# Patient Record
Sex: FEMALE | NOT HISPANIC OR LATINO | Employment: FULL TIME | URBAN - METROPOLITAN AREA
[De-identification: names, ages, dates, MRNs, and addresses within clinical notes are randomized per-mention and may not be internally consistent; named-entity substitution may affect disease eponyms.]

---

## 2017-10-04 ENCOUNTER — HOSPITAL ENCOUNTER (OUTPATIENT)
Dept: PREADMISSION TESTING | Facility: OTHER | Age: 37
Discharge: HOME | End: 2017-10-04
Attending: OBSTETRICS & GYNECOLOGY
Payer: COMMERCIAL

## 2017-10-04 VITALS
HEIGHT: 72 IN | OXYGEN SATURATION: 99 % | HEART RATE: 64 BPM | WEIGHT: 293 LBS | BODY MASS INDEX: 39.68 KG/M2 | DIASTOLIC BLOOD PRESSURE: 84 MMHG | SYSTOLIC BLOOD PRESSURE: 127 MMHG | TEMPERATURE: 98 F

## 2017-10-04 PROBLEM — D25.0 SUBMUCOUS LEIOMYOMA OF UTERUS: Status: ACTIVE | Noted: 2017-10-04

## 2017-10-04 LAB
ANION GAP SERPL CALC-SCNC: 7 MMOL/L
BUN SERPL-MCNC: 16 MG/DL
CALCIUM SERPL-MCNC: 9.8 MG/DL
CHLORIDE SERPL-SCNC: 106 MMOL/L
CO2 SERPL-SCNC: 26 MMOL/L
CREAT SERPL-MCNC: 0.7 MG/DL
EST. GFR  (AFRICAN AMERICAN): >60 ML/MIN/1.73 M^2
EST. GFR  (NON AFRICAN AMERICAN): >60 ML/MIN/1.73 M^2
GLUCOSE SERPL-MCNC: 80 MG/DL
POTASSIUM SERPL-SCNC: 3.8 MMOL/L
SODIUM SERPL-SCNC: 139 MMOL/L

## 2017-10-04 PROCEDURE — 86644 CMV ANTIBODY: CPT

## 2017-10-04 PROCEDURE — 93010 ELECTROCARDIOGRAM REPORT: CPT | Mod: ,,, | Performed by: INTERNAL MEDICINE

## 2017-10-04 PROCEDURE — 36415 COLL VENOUS BLD VENIPUNCTURE: CPT

## 2017-10-04 PROCEDURE — 86645 CMV ANTIBODY IGM: CPT

## 2017-10-04 PROCEDURE — 93005 ELECTROCARDIOGRAM TRACING: CPT

## 2017-10-04 PROCEDURE — 80048 BASIC METABOLIC PNL TOTAL CA: CPT

## 2017-10-04 RX ORDER — LIDOCAINE HYDROCHLORIDE 10 MG/ML
1 INJECTION, SOLUTION EPIDURAL; INFILTRATION; INTRACAUDAL; PERINEURAL ONCE
Status: CANCELLED | OUTPATIENT
Start: 2017-10-04 | End: 2017-10-04

## 2017-10-04 RX ORDER — SODIUM CHLORIDE, SODIUM LACTATE, POTASSIUM CHLORIDE, CALCIUM CHLORIDE 600; 310; 30; 20 MG/100ML; MG/100ML; MG/100ML; MG/100ML
INJECTION, SOLUTION INTRAVENOUS CONTINUOUS
Status: CANCELLED | OUTPATIENT
Start: 2017-10-04

## 2017-10-04 RX ORDER — MIDAZOLAM HYDROCHLORIDE 1 MG/ML
5 INJECTION INTRAMUSCULAR; INTRAVENOUS
Status: ACTIVE | OUTPATIENT
Start: 2017-10-04 | End: 2017-10-05

## 2017-10-04 NOTE — DISCHARGE INSTRUCTIONS
PRE-ADMIT TESTING -  [phone]    [address]  [place] [facility]          Your surgery has been scheduled at [facility]. We are pleased to have the opportunity to serve you. For Further Information please call [phone].    On the day of surgery please report to the Information Desk on the 1st floor.    · CONTACT YOUR PHYSICIAN'S OFFICE THE DAY PRIOR TO YOUR SURGERY TO OBTAIN YOUR ARRIVAL TIME.     · The evening before surgery do not eat anything after 9 p.m. ( this includes hard candy, chewing gum and mints).  You may only have GATORADE, POWERADE AND WATER  from 9 p.m. until you leave your home.   DO NOT DRINK ANY LIQUIDS ON THE WAY TO THE HOSPITAL.      SPECIAL MEDICATION INSTRUCTIONS: TAKE medications checked off by the Anesthesiologist on your Medication List.    Angiogram Patients: Take medications as instructed by your physician, including aspirin.     Surgery Patients:    If you take ASPIRIN - Your PHYSICIAN/SURGEON will need to inform you IF/OR when you need to stop taking aspirin prior to your surgery.     Do Not take any medications containing IBUPROFEN.  Do Not Wear any make-up or dark nail polish   (especially eye make-up) to surgery. If you come to surgery with makeup on you will be required to remove the makeup or nail polish.    Do not shave your surgical area at least 5 days prior to your surgery. The surgical prep will be performed at the hospital according to Infection Control regulations.    Leave all valuables at home.   Do Not wear any jewelry or watches, including any metal in body piercings.  Contact Lens must be removed before surgery. Either do not wear the contact lens or bring a case and solution for storage.  Please bring a container for eyeglasses or dentures as required.  Bring any paperwork your physician has provided, such as consent forms,  history and physicals, doctor's orders, etc.   Bring comfortable clothes that are loose fitting to wear upon  discharge. Take into consideration the type of surgery being performed.  Maintain your diet as advised per your physician the day prior to surgery.      Adequate rest the night before surgery is advised.   Park in the Parking lot behind the hospital or in the [facility] across the street from the parking lot. Parking is complimentary.  If you will be discharged the same day as your procedure, please arrange for a responsible adult to drive you home or to accompany you if traveling by taxi.   YOU WILL NOT BE PERMITTED TO DRIVE OR TO LEAVE THE HOSPITAL ALONE AFTER SURGERY.   It is strongly recommended that you arrange for someone to remain with you for the first 24 hrs following your surgery.       Thank you for your cooperation.  The Staff of [facility].        Bathing Instructions                                                                 Please shower the evening before and morning of your procedure with    ANTIBACTERIAL SOAP. ( DIAL, etc )  Concentrate on the surgical area   for at least 3 minutes and rinse completely. Dry off as usual.   Do not use any deodorant, powder, body lotions, perfume, after shave or    cologne.

## 2017-10-04 NOTE — PRE-PROCEDURE INSTRUCTIONS
Sx cancelled and rescheduled at another facility. Blood bank called, T&S not run yet, requested to cancel the test and Lottie notified.

## 2017-10-06 LAB
CMV IGG SERPL QL IA: REACTIVE
CMV IGM TITR SERPL: <8 U/ML

## 2018-03-05 DIAGNOSIS — D25.0 INTRAMURAL AND SUBMUCOUS LEIOMYOMA OF UTERUS: ICD-10-CM

## 2018-03-05 DIAGNOSIS — D25.1 INTRAMURAL AND SUBMUCOUS LEIOMYOMA OF UTERUS: ICD-10-CM

## 2018-03-12 ENCOUNTER — HOSPITAL ENCOUNTER (OUTPATIENT)
Dept: RADIOLOGY | Facility: OTHER | Age: 38
Discharge: HOME | End: 2018-03-12
Attending: OBSTETRICS & GYNECOLOGY
Payer: COMMERCIAL

## 2018-03-12 DIAGNOSIS — D25.0 INTRAMURAL AND SUBMUCOUS LEIOMYOMA OF UTERUS: ICD-10-CM

## 2018-03-12 DIAGNOSIS — D25.1 INTRAMURAL AND SUBMUCOUS LEIOMYOMA OF UTERUS: ICD-10-CM

## 2018-03-12 PROCEDURE — A9585 GADOBUTROL INJECTION: HCPCS | Performed by: OBSTETRICS & GYNECOLOGY

## 2018-03-12 PROCEDURE — 25500020 PHARM REV CODE 255: Performed by: OBSTETRICS & GYNECOLOGY

## 2018-03-12 PROCEDURE — 72197 MRI PELVIS W/O & W/DYE: CPT | Mod: TC

## 2018-03-12 PROCEDURE — 72197 MRI PELVIS W/O & W/DYE: CPT | Mod: 26,,, | Performed by: RADIOLOGY

## 2018-03-12 RX ORDER — GADOBUTROL 604.72 MG/ML
10 INJECTION INTRAVENOUS
Status: COMPLETED | OUTPATIENT
Start: 2018-03-12 | End: 2018-03-12

## 2018-03-12 RX ADMIN — GADOBUTROL 10 ML: 604.72 INJECTION INTRAVENOUS at 12:03

## 2018-03-20 ENCOUNTER — TELEPHONE (OUTPATIENT)
Dept: OBSTETRICS AND GYNECOLOGY | Facility: CLINIC | Age: 38
End: 2018-03-20

## 2018-03-20 NOTE — TELEPHONE ENCOUNTER
----- Message from Marc Howell MA sent at 3/19/2018  5:02 PM CDT -----  Yes, that is too late.  Try to get her in for an 8:15 am appointment

## 2018-03-20 NOTE — TELEPHONE ENCOUNTER
Attempted to contact the pt to schedule an fibroids consult with Dr. Minaya. No answer, left VM message for the pt to call the clinic back to schedule.

## 2018-06-15 ENCOUNTER — OFFICE VISIT (OUTPATIENT)
Dept: OBSTETRICS AND GYNECOLOGY | Facility: CLINIC | Age: 38
End: 2018-06-15
Attending: OBSTETRICS & GYNECOLOGY
Payer: COMMERCIAL

## 2018-06-15 VITALS
DIASTOLIC BLOOD PRESSURE: 84 MMHG | HEIGHT: 72 IN | WEIGHT: 293 LBS | SYSTOLIC BLOOD PRESSURE: 122 MMHG | BODY MASS INDEX: 39.68 KG/M2

## 2018-06-15 DIAGNOSIS — D25.0 SUBMUCOUS UTERINE FIBROID: ICD-10-CM

## 2018-06-15 DIAGNOSIS — D25.0 FIBROIDS, SUBMUCOSAL: Primary | ICD-10-CM

## 2018-06-15 DIAGNOSIS — E66.01 MORBID OBESITY WITH BMI OF 45.0-49.9, ADULT: ICD-10-CM

## 2018-06-15 PROCEDURE — 99999 PR PBB SHADOW E&M-EST. PATIENT-LVL III: CPT | Mod: PBBFAC,,, | Performed by: OBSTETRICS & GYNECOLOGY

## 2018-06-15 PROCEDURE — 99244 OFF/OP CNSLTJ NEW/EST MOD 40: CPT | Mod: S$GLB,,, | Performed by: OBSTETRICS & GYNECOLOGY

## 2018-06-15 RX ORDER — SODIUM CHLORIDE 9 MG/ML
INJECTION, SOLUTION INTRAVENOUS CONTINUOUS
Status: CANCELLED | OUTPATIENT
Start: 2018-06-15

## 2018-06-15 NOTE — PATIENT INSTRUCTIONS
Having Robotic-Assisted Myomectomy  Robotic-assisted myomectomy is a type of surgery. Its done to remove growths in a womens womb (uterus) called fibroid tumors. The tumors are not cancer. The surgery is done with special tools and a robotic controller.  What to tell your healthcare provider  Tell your healthcare provider about all the medicines you take. This includes over-the-counter medicines such as ibuprofen. It also includes vitamins, herbs, and other supplements. And tell your healthcare provider if you:  · Have had any recent changes in your health, such as an infection or fever  · Are sensitive or allergic to any medicines, latex, tape, or anesthesia (local and general)  · Are pregnant or think you may be pregnant  · Plan to get pregnant after having this surgery  Tests before your surgery  Before your surgery, a healthcare provider will ask you questions about your health. He or she will also examine you. This includes checking your heart and lungs. You may need tests such as:  · Electrocardiogram to check your heart rhythm  · Ultrasound exam of your pelvis to look at your fibroids  · MRI to get more information about your fibroids  · Blood tests to check your overall health  Getting ready for your surgery  Talk with your healthcare provider how to get ready for your surgery. You may need to stop taking some medicines before the procedure, such as blood thinners and aspirin. If you smoke, you may need to stop before your surgery. Smoking can delay healing. Talk with your healthcare provider if you need help to stop smoking.  Also, make sure to:  · Ask a family member or friend to take you home from the hospital  · Not eat or drink after midnight the night before your surgery  · Follow all other instructions from your healthcare provider  You will be asked to sign a consent form that gives your permission to do the procedure. Read the form carefully. Ask questions if something is not clear.  On the day  of your surgery  Your doctor will explain the details of your surgery. Your surgery will be done by an obstetrician/gynecologist (OB/GYN) surgeon. He or she will work with a team of specialized nurses. The surgery can be done in several ways. Ask your doctor about the details of your surgery. The whole procedure may take a couple of hours. In general, you can expect the following:  · You will have general anesthesia, a medicine that allows you to sleep through the surgery. You wont feel any pain during the surgery.  · A healthcare provider will watch your vital signs, like your heart rate and blood pressure, during the surgery.  · You may be given antibiotics during and after the surgery. This is to help prevent infection.  · After cleaning your skin, the surgeon will make a few small cuts (incisions) in your abdomen.  · A small tube may be used to send some gas into your abdomen. This helps the surgeon see the area better during surgery.  · the surgeon will pass tools through the small incisions. These include a tiny camera with a light, and several robotic tools. The robotic tools allow the surgeon to make very small movements.  · The surgeon will use the robotic controller to move the tools and remove the fibroids.  · When the surgery is done, the tools will be removed. The incisions will be closed and bandaged.  After your surgery  After the surgery, a healthcare provider will watch your vital signs. He or she will also check your incisions. You may be able to go home the same day. Or you may need to stay overnight.  Recovering at home  Make sure you move around as much as possible after your surgery. This helps to prevent problems like blood clots. You may have some pain after the surgery. This includes pain in your shoulder from the gas used during surgery. Your healthcare provider will tell you what to take for pain.  Follow-up care  Make sure you follow all of your healthcare providers instructions. Dont  miss any of your follow-up appointments. Your fibroid symptoms may go away after surgery. Fibroids can grow back or new ones can form. Talk with your healthcare provider if your symptoms return. Tell your provider if you get pregnant after having this surgery.  When to call your healthcare provider  Call your healthcare provider right away if you have any of these:  · Fever of 100.4°F (38.0°C) or higher  · Pain that is getting worse  · Redness or warmth near the incisions  · Vaginal bleeding  · Lots of fluid leaking from your incisions   Date Last Reviewed: 8/10/2015  © 4202-4126 [employer]. [address] [place] [address]. All rights reserved. This information is not intended as a substitute for professional medical care. Always follow your healthcare professional's instructions.        Understanding Robotic-Assisted Myomectomy  Robotic-assisted myomectomy is a type of surgery. Its done to remove growths in a womens womb (uterus) called fibroid tumors. The tumors are not cancer. The surgery is done with special tools and a robotic controller.  What are fibroids?  The uterus is the organ where a baby grows during pregnancy. Its in the lower belly (abdomen). Fibroid tumors are a very common type of growth in the uterus. They are also called leiomyomas or myomas. They are not cancer. They vary in size and number. A woman may have 1 or more fibroid tumors. They may be very small or as large as a grapefruit. A womans risk of having fibroid tumors increases as she gets older, until she no longer has menstrual periods (menopause).  Why robotic-assisted myomectomy is done  You might need this surgery if you have fibroids that cause symptoms such as:  · Heavy and long-lasting bleeding during your period  · Lower belly (pelvic) pain  · Pressure on the bladder or bowels  · Trouble getting or staying pregnant  This is one type of surgery to remove fibroids. It is done with smaller incisions than  standard surgery. This is known as minimally invasive surgery. It has some benefits over other surgery to remove fibroids. They may include:  · Lower risk for complications (for example, less bleeding during surgery)  · [place] hospital stay  · Faster recovery  · Uterus is left in place  But robotic-assisted myomectomy may:  · Take longer than other surgeries  · Not be available where you live  · Cost more  Your healthcare provider can help you decide which surgery will work best for you.  How robotic-assisted myomectomy is done   The surgery will be done by an obstetrician/gynecologist (OB/GYN) surgeon. It can be done in several ways. The surgeon will make a few small cuts (incisions) in your abdomen. He or she will pass tools through the small incisions. These include a tiny camera with a light, and several robotic tools. The surgeon will use a robotic controller to move the tools and remove the fibroids.  Risks of robotic-assisted myomectomy  Every surgery has risks. Risks of robotic-assisted myomectomy include:  · Infection  · Bleeding  · Blood clots  · Injury to nearby organs  · Problems with a future pregnancy  · Reaction to anesthesia  · Return of fibroids after surgery  Your risks may vary depending on your age and overall health. They also depend on the number and location of the fibroids. Talk with your healthcare provider about which risks apply most to you.  Date Last Reviewed: 5/6/2015  © 5505-0330 [employer]. [address] [place] [address], [place] [address]. All rights reserved. This information is not intended as a substitute for professional medical care. Always follow your healthcare professional's instructions.

## 2018-06-15 NOTE — LETTER
Nirmala 15, 2018      Karen Crow MD  [address]  [facility] [address]           [facility] [place], operated [facility] [address]  [address]  [facility] [address]  Phone: [phone]  Fax: [phone]          Patient: Annie Mast   MR Number: [id number]   YOB: 1980   Date of Visit: 6/15/2018       Dear Dr. Karen Crow:    Thank you for referring Annie Mast to me for evaluation. Attached you will find relevant portions of my assessment and plan of care.    If you have questions, please do not hesitate to call me. I look forward to following Annie Mast along with you.    Sincerely,    Tiffani Minaya MD    Enclosure  CC:  No Recipients    If you would like to receive this communication electronically, please contact [email or web address] or [phone] to request more information on [employer] access.    For providers and/or their staff who would like to refer a patient to [facility], please contact us through our one-stop-shop provider referral line, [facility], at [phone].    If you feel you have received this communication in error or would no longer like to receive these types of communications, please e-mail [email or web address]

## 2018-06-15 NOTE — PROGRESS NOTES
CC: Symptoms related to fibroids    Annie Mast is a 37 y.o. female  presents for a consultation from Dr. Crow for management of fibroids.      She and her partner are planning for IUI.  On ultrasound she was found to have a submucosal fibroid.  It was removed hysteroscopically.  In preparation for IUI, she had another ultrasound and was found to have another submucosal fibroid, however, this submucosal fibroid is not completely in the cavity.  She has been referred by Dr. Crow for removal.      She reports cycles are heavy.    MRI showed:    Uterus appears mildly and lobulated. It measures approximately 9.0 x 6.5 x 5.1 cm. There are multiple round low T1, low T2 signal intensity foci within the uterus. The largest is a lobulated submucosal focus near the uterine fundus just right of midline and measuring up to 2.4 cm in diameter. There is subcentimeter submucosal lesion in the posterior corpus. 1.7 cm intramural fibroid in anterior uterine corpus. 3 additional subcentimeter intramural lesions identified.     Ovaries appear within normal limits. Trace free fluid in the pelvis, likely physiologic.    Bladder within normal limits. Visualized loops of small bowel and colon unremarkable. Osseous structures are unremarkable.       Past Medical History:   Diagnosis Date    Obesity     Uterine fibroid        Past Surgical History:   Procedure Laterality Date    HYSTEROSCOPY      Hysteroscopic myomectomy    MOUTH SURGERY         Family History   Problem Relation Age of Onset    Heart disease Father     Early death Father     Breast cancer Maternal Aunt     Colon cancer Neg Hx     Ovarian cancer Neg Hx        Social History   Substance Use Topics    Smoking status: Never Smoker    Smokeless tobacco: Not on file    Alcohol use Yes      Comment: rare       OB History    Para Term  AB Living   0 0 0 0 0 0   SAB TAB Ectopic Multiple Live Births   0 0 0 0 0             /84   Ht 6'  (1.829 m)   Wt (!) 152.2 kg (335 lb 8.6 oz)   LMP 05/15/2018 (Exact Date)   BMI 45.51 kg/m²     ROS:  GENERAL: Denies weight gain or weight loss. Feeling well overall.   SKIN: Denies rash or lesions.   HEAD: Denies head injury or headache.   NODES: Denies enlarged lymph nodes.   CHEST: Denies chest pain or shortness of breath.   CARDIOVASCULAR: Denies palpitations or left sided chest pain.   ABDOMEN: No abdominal pain, constipation, diarrhea, nausea, vomiting or rectal bleeding.   URINARY: No frequency, dysuria, hematuria, or burning on urination.  REPRODUCTIVE: See HPI.   BREASTS: The patient performs breast self-examination and denies pain, lumps, or nipple discharge.   HEMATOLOGIC: No easy bruisability or excessive bleeding with the exception of menstrual cycles.  MUSCULOSKELETAL: Denies joint pain or swelling.   NEUROLOGIC: Denies syncope or weakness.   PSYCHIATRIC: Denies depression, anxiety or mood swings.    PHYSICAL EXAM:  APPEARANCE: Well nourished, well developed, in no acute distress.  AFFECT: WNL, alert and oriented x 3  SKIN: No acne or hirsutism  NECK: Neck symmetric without masses or thyromegaly  NODES: No inguinal, cervical, axillary, or femoral lymph node enlargement  CHEST: Good respiratory effect  ABDOMEN: Soft.  No tenderness or masses.  No hepatosplenomegaly.  No hernias.  PELVIC: Normal external genitalia without lesions.  Normal hair distribution.  Adequate perineal body, normal urethral meatus.  Vagina moist and well rugated without lesions or discharge.  Cervix pink, without lesions, discharge or tenderness.  No significant cystocele or rectocele.  Bimanual exam shows uterus to be normal size, regular, mobile and nontender but difficult to assess due to body habitus.  Adnexa without masses or tenderness  but difficult to assess due to body habitus.    EXTREMITIES: No edema.      ICD-10-CM ICD-9-CM    1. Fibroids, submucosal D25.0 218.0 Up ad frieda      Fu to [place]      POCT glucose       Notify physician if BS > 180 for hysterectomy patients      Chlorhexidine (CHG) 2% Wipes      Notify Physician/Vital Signs Parameters Urine output less than 0.5mL/kg/hr (with indwelling catheter) or 30 mL/hr (without indwelling catheter) or blood glucose greater than 200 mg/dL      Notify physician      Notify Physician - Potential Need of Opioid Reversal      Chlorohexidine Gluconate Bath      Case Request Operating Room: ROBOTIC MYOMECTOMY, UTERUS, MYOMECTOMY, HYSTEROSCOPIC      Place in Outpatient      Vital signs      Diet NPO      Place sequential compression device      Notify Physician - Potential Need of Opioid Reversal   2. Submucous uterine fibroid D25.0 218.0    3. Morbid obesity with BMI of 45.0-49.9, adult E66.01 278.01     Z68.42 V85.42          Patient was counseled today on treatment options for fibroids.  Discussed in review of the MRI she has a completely submucosal fibroid and another fibroid that is approximately 60% in the cavity but 40% intramural.  Discussed submucosal fibroids increase the risk for miscarriage and removal is recommended.  Discussed treatment options - expectant management, hysteroscopic resection, combined hysteroscopic and robotic assisted laparoscopic myomectomy.  Patient desires to proceed with hysteroscopic and robotic assisted laparoscopic myomectomy.  Discussed the risk of not being able to remove all fibroids, risk of recurrence of fibroids, need to delay conception for 4-6 months, possible need for .  Also discussed risks of surgery due to her obesity.      Surgery scheduled

## 2018-08-31 ENCOUNTER — HOSPITAL ENCOUNTER (OUTPATIENT)
Dept: PREADMISSION TESTING | Facility: OTHER | Age: 38
Discharge: HOME | End: 2018-08-31
Attending: OBSTETRICS & GYNECOLOGY
Payer: COMMERCIAL

## 2018-08-31 ENCOUNTER — ANESTHESIA EVENT (OUTPATIENT)
Dept: SURGERY | Facility: OTHER | Age: 38
End: 2018-08-31
Payer: COMMERCIAL

## 2018-08-31 ENCOUNTER — OFFICE VISIT (OUTPATIENT)
Dept: OBSTETRICS AND GYNECOLOGY | Facility: CLINIC | Age: 38
End: 2018-08-31
Attending: OBSTETRICS & GYNECOLOGY
Payer: COMMERCIAL

## 2018-08-31 VITALS
HEIGHT: 72 IN | SYSTOLIC BLOOD PRESSURE: 132 MMHG | BODY MASS INDEX: 39.68 KG/M2 | WEIGHT: 293 LBS | DIASTOLIC BLOOD PRESSURE: 86 MMHG

## 2018-08-31 VITALS
HEART RATE: 69 BPM | DIASTOLIC BLOOD PRESSURE: 89 MMHG | TEMPERATURE: 98 F | OXYGEN SATURATION: 100 % | SYSTOLIC BLOOD PRESSURE: 128 MMHG | BODY MASS INDEX: 39.68 KG/M2 | HEIGHT: 72 IN | WEIGHT: 293 LBS

## 2018-08-31 DIAGNOSIS — E66.01 MORBID OBESITY WITH BMI OF 45.0-49.9, ADULT: ICD-10-CM

## 2018-08-31 DIAGNOSIS — D25.0 SUBMUCOUS UTERINE FIBROID: ICD-10-CM

## 2018-08-31 DIAGNOSIS — Z01.818 PREOPERATIVE EXAM FOR GYNECOLOGIC SURGERY: Primary | ICD-10-CM

## 2018-08-31 LAB
ABO + RH BLD: NORMAL
BASOPHILS # BLD AUTO: 0.03 K/UL
BASOPHILS NFR BLD: 0.6 %
BLD GP AB SCN CELLS X3 SERPL QL: NORMAL
DIFFERENTIAL METHOD: ABNORMAL
EOSINOPHIL # BLD AUTO: 0 K/UL
EOSINOPHIL NFR BLD: 0.2 %
ERYTHROCYTE [DISTWIDTH] IN BLOOD BY AUTOMATED COUNT: 15.2 %
HCT VFR BLD AUTO: 36.6 %
HGB BLD-MCNC: 11.5 G/DL
LYMPHOCYTES # BLD AUTO: 1.5 K/UL
LYMPHOCYTES NFR BLD: 31.1 %
MCH RBC QN AUTO: 23.5 PG
MCHC RBC AUTO-ENTMCNC: 31.4 G/DL
MCV RBC AUTO: 75 FL
MONOCYTES # BLD AUTO: 0.5 K/UL
MONOCYTES NFR BLD: 9.3 %
NEUTROPHILS # BLD AUTO: 2.9 K/UL
NEUTROPHILS NFR BLD: 58.6 %
PLATELET # BLD AUTO: 199 K/UL
PMV BLD AUTO: 10.6 FL
RBC # BLD AUTO: 4.89 M/UL
WBC # BLD AUTO: 4.92 K/UL

## 2018-08-31 PROCEDURE — 85025 COMPLETE CBC W/AUTO DIFF WBC: CPT

## 2018-08-31 PROCEDURE — 99999 PR PBB SHADOW E&M-EST. PATIENT-LVL II: CPT | Mod: PBBFAC,,, | Performed by: OBSTETRICS & GYNECOLOGY

## 2018-08-31 PROCEDURE — 99499 UNLISTED E&M SERVICE: CPT | Mod: S$GLB,,, | Performed by: OBSTETRICS & GYNECOLOGY

## 2018-08-31 PROCEDURE — 86850 RBC ANTIBODY SCREEN: CPT

## 2018-08-31 PROCEDURE — 36415 COLL VENOUS BLD VENIPUNCTURE: CPT

## 2018-08-31 RX ORDER — SODIUM CHLORIDE, SODIUM LACTATE, POTASSIUM CHLORIDE, CALCIUM CHLORIDE 600; 310; 30; 20 MG/100ML; MG/100ML; MG/100ML; MG/100ML
INJECTION, SOLUTION INTRAVENOUS CONTINUOUS
Status: CANCELLED | OUTPATIENT
Start: 2018-08-31

## 2018-08-31 RX ORDER — FAMOTIDINE 20 MG/1
20 TABLET, FILM COATED ORAL
Status: CANCELLED | OUTPATIENT
Start: 2018-08-31 | End: 2018-08-31

## 2018-08-31 RX ORDER — SCOLOPAMINE TRANSDERMAL SYSTEM 1 MG/1
1 PATCH, EXTENDED RELEASE TRANSDERMAL
Status: CANCELLED | OUTPATIENT
Start: 2018-08-31

## 2018-08-31 RX ORDER — PREGABALIN 75 MG/1
150 CAPSULE ORAL ONCE
Status: DISCONTINUED | OUTPATIENT
Start: 2018-08-31 | End: 2018-09-01 | Stop reason: HOSPADM

## 2018-08-31 NOTE — DISCHARGE INSTRUCTIONS
PRE-ADMIT TESTING -  [phone]    [address]  [place] [facility]          Your surgery has been scheduled at [facility]. We are pleased to have the opportunity to serve you. For Further Information please call [phone].    On the day of surgery please report to the Information Desk on the 1st floor.    · CONTACT YOUR PHYSICIAN'S OFFICE THE DAY PRIOR TO YOUR SURGERY TO OBTAIN YOUR ARRIVAL TIME.     · The evening before surgery do not eat anything after 9 p.m. ( this includes hard candy, chewing gum and mints).  You may only have GATORADE, POWERADE AND WATER  from 9 p.m. until you leave your home.   DO NOT DRINK ANY LIQUIDS ON THE WAY TO THE HOSPITAL.      SPECIAL MEDICATION INSTRUCTIONS: TAKE medications checked off by the Anesthesiologist on your Medication List.    Angiogram Patients: Take medications as instructed by your physician, including aspirin.     Surgery Patients:    If you take ASPIRIN - Your PHYSICIAN/SURGEON will need to inform you IF/OR when you need to stop taking aspirin prior to your surgery.     Do Not take any medications containing IBUPROFEN.  Do Not Wear any make-up or dark nail polish   (especially eye make-up) to surgery. If you come to surgery with makeup on you will be required to remove the makeup or nail polish.    Do not shave your surgical area at least 5 days prior to your surgery. The surgical prep will be performed at the hospital according to Infection Control regulations.    Leave all valuables at home.   Do Not wear any jewelry or watches, including any metal in body piercings.  Contact Lens must be removed before surgery. Either do not wear the contact lens or bring a case and solution for storage.  Please bring a container for eyeglasses or dentures as required.  Bring any paperwork your physician has provided, such as consent forms,  history and physicals, doctor's orders, etc.   Bring comfortable clothes that are loose fitting to wear upon  discharge. Take into consideration the type of surgery being performed.  Maintain your diet as advised per your physician the day prior to surgery.      Adequate rest the night before surgery is advised.   Park in the Parking lot behind the hospital or in the [place] [facility] across the street from the parking lot. Parking is complimentary.  If you will be discharged the same day as your procedure, please arrange for a responsible adult to drive you home or to accompany you if traveling by taxi.   YOU WILL NOT BE PERMITTED TO DRIVE OR TO LEAVE THE HOSPITAL ALONE AFTER SURGERY.   It is strongly recommended that you arrange for someone to remain with you for the first 24 hrs following your surgery.       Thank you for your cooperation.  The Staff of [facility].        Bathing Instructions                                                                 Please shower the evening before and morning of your procedure with    ANTIBACTERIAL SOAP. ( DIAL, etc )  Concentrate on the surgical area   for at least 3 minutes and rinse completely. Dry off as usual.   Do not use any deodorant, powder, body lotions, perfume, after shave or    cologne.

## 2018-08-31 NOTE — ANESTHESIA PREPROCEDURE EVALUATION
08/31/2018  Annie Mast is a 38 y.o., female.    Anesthesia Evaluation    I have reviewed the Patient Summary Reports.    I have reviewed the Nursing Notes.   I have reviewed the Medications.     Review of Systems  Anesthesia Hx:  No problems with previous Anesthesia  Denies Family Hx of Anesthesia complications.  Personal Hx of Anesthesia complications, Post-Operative Nausea/Vomiting   Social:  Non-Smoker    Hematology/Oncology:  Hematology Normal   Oncology Normal     EENT/Dental:EENT/Dental Normal   Cardiovascular:  Cardiovascular Normal Exercise tolerance: good     Pulmonary:  Pulmonary Normal    Renal/:  Renal/ Normal     Musculoskeletal:  Musculoskeletal Normal    Neurological:  Neurology Normal    Endocrine:  Endocrine Normal    Dermatological:  Skin Normal    Psych:  Psychiatric Normal           Physical Exam  General:  Morbid Obesity    Airway/Jaw/Neck:  Airway Findings: Mouth Opening: Normal Tongue: Normal  General Airway Assessment: Adult  Mallampati: I  TM Distance: Normal, at least 6 cm  Jaw/Neck Findings:  Neck ROM: Normal ROM      Dental:  Dental Findings: In tact             Anesthesia Plan  Type of Anesthesia, risks & benefits discussed:  Anesthesia Type:  general  Patient's Preference:   Intra-op Monitoring Plan: standard ASA monitors  Intra-op Monitoring Plan Comments:   Post Op Pain Control Plan: per primary service following discharge from PACU  Post Op Pain Control Plan Comments:   Induction:   IV  Beta Blocker:         Informed Consent: Patient understands risks and agrees with Anesthesia plan.  Questions answered. Anesthesia consent signed with patient.  ASA Score: 3     Day of Surgery Review of History & Physical:    H&P update referred to the surgeon.  H&P completed by Anesthesiologist.       Ready For Surgery From Anesthesia Perspective.

## 2018-08-31 NOTE — PROGRESS NOTES
CC: Preop exam    Annie Mast is a 38 y.o. female  presents for a pre-op exam for a hysteroscopy and DVM scheduled on .      She and her partner are planning for IUI.  On ultrasound she was found to have a submucosal fibroid.  It was removed hysteroscopically.  In preparation for IUI, she had another ultrasound and was found to have another submucosal fibroid, however, this submucosal fibroid is not completely in the cavity.  She has been referred by Dr. Crow for removal.       She reports cycles are heavy.     MRI showed:     Uterus appears mildly and lobulated. It measures approximately 9.0 x 6.5 x 5.1 cm. There are multiple round low T1, low T2 signal intensity foci within the uterus. The largest is a lobulated submucosal focus near the uterine fundus just right of midline and measuring up to 2.4 cm in diameter. There is subcentimeter submucosal lesion in the posterior corpus. 1.7 cm intramural fibroid in anterior uterine corpus. 3 additional subcentimeter intramural lesions identified.     Ovaries appear within normal limits. Trace free fluid in the pelvis, likely physiologic.    Bladder within normal limits. Visualized loops of small bowel and colon unremarkable. Osseous structures are unremarkable.    Past Medical History:   Diagnosis Date    Obesity     Uterine fibroid        Past Surgical History:   Procedure Laterality Date    HYSTEROSCOPY      Hysteroscopic myomectomy    MOUTH SURGERY         OB History    Para Term  AB Living   0 0 0 0 0 0   SAB TAB Ectopic Multiple Live Births   0 0 0 0 0             Family History   Problem Relation Age of Onset    Heart disease Father     Early death Father     Breast cancer Maternal Aunt     Colon cancer Neg Hx     Ovarian cancer Neg Hx        Social History     Tobacco Use    Smoking status: Never Smoker   Substance Use Topics    Alcohol use: Yes     Comment: rare    Drug use: No       /86   Ht 6'  (1.829 m)   Wt (!) 150.9 kg (332 lb 10.8 oz)   LMP 08/12/2018   BMI 45.12 kg/m²     ROS:  GENERAL: Denies weight gain or weight loss. Feeling well overall.   SKIN: Denies rash or lesions.   HEAD: Denies head injury or headache.   NODES: Denies enlarged lymph nodes.   CHEST: Denies chest pain or shortness of breath.   CARDIOVASCULAR: Denies palpitations or left sided chest pain.   ABDOMEN: No abdominal pain, constipation, diarrhea, nausea, vomiting or rectal bleeding.   URINARY: No frequency, dysuria, hematuria, or burning on urination.  REPRODUCTIVE: See HPI.   HEMATOLOGIC: No easy bruisability or excessive bleeding with the exception of menstrual cycles.  MUSCULOSKELETAL: Denies joint pain or swelling.   NEUROLOGIC: Denies syncope or weakness.   PSYCHIATRIC: Denies depression, anxiety or mood swings.    PHYSICAL EXAM:  APPEARANCE: Well nourished, well developed, in no acute distress.  AFFECT: WNL, alert and oriented x 3  SKIN: No acne or hirsutism  NECK: Neck symmetric without masses or thyromegaly  NODES: No inguinal, cervical, axillary, or femoral lymph node enlargement  CHEST: Good respiratory effect  ABDOMEN: Soft.  No tenderness or masses.  No hepatosplenomegaly.  No hernias.  PELVIC: Normal external genitalia without lesions.  Normal hair distribution.  Adequate perineal body, normal urethral meatus.  Vagina moist and well rugated without lesions or discharge.  Cervix pink, without lesions, discharge or tenderness.  No significant cystocele or rectocele.  Bimanual exam shows uterus to be normal size, regular, mobile and nontender.  Adnexa without masses or tenderness.    RECTAL: Rectovaginal exam confirms above with normal sphincter tone, no masses.  EXTREMITIES: No edema.      ICD-10-CM ICD-9-CM    1. Preoperative exam for gynecologic surgery Z01.818 V72.83    2. Submucous uterine fibroid D25.0 218.0    3. Morbid obesity with BMI of 45.0-49.9, adult E66.01 278.01     Z68.42 V85.42        Patient was  counseled today on treatment options for fibroids.  Discussed in review of the MRI she has a completely submucosal fibroid and another fibroid that is approximately 60% in the cavity but 40% intramural.  Discussed submucosal fibroids increase the risk for miscarriage and removal is recommended.  Discussed treatment options - expectant management, hysteroscopic resection, combined hysteroscopic and robotic assisted laparoscopic myomectomy.  Patient desires to proceed with hysteroscopic and robotic assisted laparoscopic myomectomy.  Discussed the risk of not being able to remove all fibroids, risk of recurrence of fibroids, need to delay conception for 4-6 months, possible need for .  Also discussed risks of surgery due to her obesity including but not limited to SSI, increased risk of conversion to laparotomy due to inability to ventilate.     I have discussed the risks, benefits, indications, and alternatives of the procedure in detail.  The patient verbalizes her understanding.  All questions answered.  Consents signed.  The patient agrees to proceed to proceed as planned.

## 2018-09-04 ENCOUNTER — HOSPITAL ENCOUNTER (OUTPATIENT)
Facility: OTHER | Age: 38
Discharge: HOME | End: 2018-09-04
Attending: OBSTETRICS & GYNECOLOGY | Admitting: OBSTETRICS & GYNECOLOGY
Payer: COMMERCIAL

## 2018-09-04 ENCOUNTER — ANESTHESIA (OUTPATIENT)
Dept: SURGERY | Facility: OTHER | Age: 38
End: 2018-09-04
Payer: COMMERCIAL

## 2018-09-04 VITALS
HEART RATE: 73 BPM | WEIGHT: 293 LBS | BODY MASS INDEX: 39.68 KG/M2 | TEMPERATURE: 98 F | OXYGEN SATURATION: 98 % | DIASTOLIC BLOOD PRESSURE: 80 MMHG | HEIGHT: 72 IN | SYSTOLIC BLOOD PRESSURE: 141 MMHG | RESPIRATION RATE: 18 BRPM

## 2018-09-04 DIAGNOSIS — D25.0 FIBROIDS, SUBMUCOSAL: ICD-10-CM

## 2018-09-04 DIAGNOSIS — D25.0 SUBMUCOUS UTERINE FIBROID: ICD-10-CM

## 2018-09-04 PROBLEM — Z98.890 STATUS POST ROBOT-ASSISTED SURGICAL PROCEDURE: Status: ACTIVE | Noted: 2018-09-04

## 2018-09-04 PROBLEM — Z98.890 S/P ROBOT-ASSISTED SURGICAL PROCEDURE: Status: ACTIVE | Noted: 2018-09-04

## 2018-09-04 LAB
B-HCG UR QL: NEGATIVE
CTP QC/QA: YES

## 2018-09-04 PROCEDURE — 71000016 HC POSTOP RECOV ADDL HR: Performed by: OBSTETRICS & GYNECOLOGY

## 2018-09-04 PROCEDURE — 25000003 PHARM REV CODE 250: Performed by: ANESTHESIOLOGY

## 2018-09-04 PROCEDURE — C1782 MORCELLATOR: HCPCS | Performed by: OBSTETRICS & GYNECOLOGY

## 2018-09-04 PROCEDURE — 71000033 HC RECOVERY, INTIAL HOUR: Performed by: OBSTETRICS & GYNECOLOGY

## 2018-09-04 PROCEDURE — 88305 TISSUE EXAM BY PATHOLOGIST: CPT | Mod: 26,,, | Performed by: PATHOLOGY

## 2018-09-04 PROCEDURE — 37000008 HC ANESTHESIA 1ST 15 MINUTES: Performed by: OBSTETRICS & GYNECOLOGY

## 2018-09-04 PROCEDURE — 36000711: Performed by: OBSTETRICS & GYNECOLOGY

## 2018-09-04 PROCEDURE — 71000015 HC POSTOP RECOV 1ST HR: Performed by: OBSTETRICS & GYNECOLOGY

## 2018-09-04 PROCEDURE — 25000003 PHARM REV CODE 250: Performed by: SPECIALIST

## 2018-09-04 PROCEDURE — 58561 HYSTEROSCOPY REMOVE MYOMA: CPT | Mod: 59,,, | Performed by: OBSTETRICS & GYNECOLOGY

## 2018-09-04 PROCEDURE — 81025 URINE PREGNANCY TEST: CPT | Performed by: ANESTHESIOLOGY

## 2018-09-04 PROCEDURE — 36000710: Performed by: OBSTETRICS & GYNECOLOGY

## 2018-09-04 PROCEDURE — 63600175 PHARM REV CODE 636 W HCPCS: Performed by: SPECIALIST

## 2018-09-04 PROCEDURE — 25000003 PHARM REV CODE 250: Performed by: NURSE ANESTHETIST, CERTIFIED REGISTERED

## 2018-09-04 PROCEDURE — 88305 TISSUE EXAM BY PATHOLOGIST: CPT | Performed by: PATHOLOGY

## 2018-09-04 PROCEDURE — P9045 ALBUMIN (HUMAN), 5%, 250 ML: HCPCS | Mod: JG | Performed by: NURSE ANESTHETIST, CERTIFIED REGISTERED

## 2018-09-04 PROCEDURE — 63600175 PHARM REV CODE 636 W HCPCS: Performed by: ANESTHESIOLOGY

## 2018-09-04 PROCEDURE — 63600175 PHARM REV CODE 636 W HCPCS: Performed by: NURSE ANESTHETIST, CERTIFIED REGISTERED

## 2018-09-04 PROCEDURE — 25000003 PHARM REV CODE 250: Performed by: OBSTETRICS & GYNECOLOGY

## 2018-09-04 PROCEDURE — 71000039 HC RECOVERY, EACH ADD'L HOUR: Performed by: OBSTETRICS & GYNECOLOGY

## 2018-09-04 PROCEDURE — 63600175 PHARM REV CODE 636 W HCPCS: Performed by: OBSTETRICS & GYNECOLOGY

## 2018-09-04 PROCEDURE — 37000009 HC ANESTHESIA EA ADD 15 MINS: Performed by: OBSTETRICS & GYNECOLOGY

## 2018-09-04 PROCEDURE — 27201423 OPTIME MED/SURG SUP & DEVICES STERILE SUPPLY: Performed by: OBSTETRICS & GYNECOLOGY

## 2018-09-04 PROCEDURE — 58545 LAPAROSCOPIC MYOMECTOMY: CPT | Mod: ,,, | Performed by: OBSTETRICS & GYNECOLOGY

## 2018-09-04 RX ORDER — DIPHENHYDRAMINE HCL 25 MG
25 CAPSULE ORAL EVERY 4 HOURS PRN
Status: DISCONTINUED | OUTPATIENT
Start: 2018-09-04 | End: 2018-09-04 | Stop reason: HOSPADM

## 2018-09-04 RX ORDER — GLYCOPYRROLATE 0.2 MG/ML
INJECTION INTRAMUSCULAR; INTRAVENOUS
Status: DISCONTINUED | OUTPATIENT
Start: 2018-09-04 | End: 2018-09-04

## 2018-09-04 RX ORDER — KETOROLAC TROMETHAMINE 30 MG/ML
INJECTION, SOLUTION INTRAMUSCULAR; INTRAVENOUS
Status: DISCONTINUED | OUTPATIENT
Start: 2018-09-04 | End: 2018-09-04

## 2018-09-04 RX ORDER — ONDANSETRON 2 MG/ML
INJECTION INTRAMUSCULAR; INTRAVENOUS
Status: DISCONTINUED | OUTPATIENT
Start: 2018-09-04 | End: 2018-09-04

## 2018-09-04 RX ORDER — DIPHENHYDRAMINE HYDROCHLORIDE 50 MG/ML
25 INJECTION INTRAMUSCULAR; INTRAVENOUS EVERY 4 HOURS PRN
Status: DISCONTINUED | OUTPATIENT
Start: 2018-09-04 | End: 2018-09-04 | Stop reason: HOSPADM

## 2018-09-04 RX ORDER — NEOSTIGMINE METHYLSULFATE 1 MG/ML
INJECTION, SOLUTION INTRAVENOUS
Status: DISCONTINUED | OUTPATIENT
Start: 2018-09-04 | End: 2018-09-04

## 2018-09-04 RX ORDER — SUCCINYLCHOLINE CHLORIDE 20 MG/ML
INJECTION INTRAMUSCULAR; INTRAVENOUS
Status: DISCONTINUED | OUTPATIENT
Start: 2018-09-04 | End: 2018-09-04

## 2018-09-04 RX ORDER — OXYCODONE AND ACETAMINOPHEN 5; 325 MG/1; MG/1
1 TABLET ORAL EVERY 4 HOURS PRN
Qty: 20 TABLET | Refills: 0 | Status: SHIPPED | OUTPATIENT
Start: 2018-09-04 | End: 2018-09-20

## 2018-09-04 RX ORDER — FENTANYL CITRATE 50 UG/ML
INJECTION, SOLUTION INTRAMUSCULAR; INTRAVENOUS
Status: DISCONTINUED | OUTPATIENT
Start: 2018-09-04 | End: 2018-09-04

## 2018-09-04 RX ORDER — MEPERIDINE HYDROCHLORIDE 50 MG/ML
12.5 INJECTION INTRAMUSCULAR; INTRAVENOUS; SUBCUTANEOUS ONCE AS NEEDED
Status: DISCONTINUED | OUTPATIENT
Start: 2018-09-04 | End: 2018-09-04

## 2018-09-04 RX ORDER — IBUPROFEN 600 MG/1
600 TABLET ORAL EVERY 6 HOURS PRN
Qty: 30 TABLET | Refills: 1 | Status: SHIPPED | OUTPATIENT
Start: 2018-09-04 | End: 2018-09-20 | Stop reason: ALTCHOICE

## 2018-09-04 RX ORDER — SODIUM CHLORIDE 9 MG/ML
INJECTION, SOLUTION INTRAVENOUS CONTINUOUS
Status: DISCONTINUED | OUTPATIENT
Start: 2018-09-04 | End: 2018-09-04

## 2018-09-04 RX ORDER — SODIUM CHLORIDE 9 MG/ML
INJECTION, SOLUTION INTRAVENOUS CONTINUOUS
Status: DISCONTINUED | OUTPATIENT
Start: 2018-09-04 | End: 2018-09-04 | Stop reason: HOSPADM

## 2018-09-04 RX ORDER — KETOROLAC TROMETHAMINE 30 MG/ML
30 INJECTION, SOLUTION INTRAMUSCULAR; INTRAVENOUS EVERY 8 HOURS
Status: DISCONTINUED | OUTPATIENT
Start: 2018-09-04 | End: 2018-09-04 | Stop reason: HOSPADM

## 2018-09-04 RX ORDER — SCOLOPAMINE TRANSDERMAL SYSTEM 1 MG/1
1 PATCH, EXTENDED RELEASE TRANSDERMAL
Status: DISCONTINUED | OUTPATIENT
Start: 2018-09-04 | End: 2018-09-04

## 2018-09-04 RX ORDER — ALBUMIN HUMAN 50 G/1000ML
SOLUTION INTRAVENOUS CONTINUOUS PRN
Status: DISCONTINUED | OUTPATIENT
Start: 2018-09-04 | End: 2018-09-04

## 2018-09-04 RX ORDER — PREGABALIN 75 MG/1
150 CAPSULE ORAL ONCE
Status: COMPLETED | OUTPATIENT
Start: 2018-09-04 | End: 2018-09-04

## 2018-09-04 RX ORDER — OXYCODONE HYDROCHLORIDE 5 MG/1
5 TABLET ORAL
Status: DISCONTINUED | OUTPATIENT
Start: 2018-09-04 | End: 2018-09-04

## 2018-09-04 RX ORDER — SODIUM CHLORIDE, SODIUM LACTATE, POTASSIUM CHLORIDE, CALCIUM CHLORIDE 600; 310; 30; 20 MG/100ML; MG/100ML; MG/100ML; MG/100ML
INJECTION, SOLUTION INTRAVENOUS CONTINUOUS
Status: DISCONTINUED | OUTPATIENT
Start: 2018-09-04 | End: 2018-09-04

## 2018-09-04 RX ORDER — HYDROCODONE BITARTRATE AND ACETAMINOPHEN 5; 325 MG/1; MG/1
1 TABLET ORAL EVERY 4 HOURS PRN
Status: DISCONTINUED | OUTPATIENT
Start: 2018-09-04 | End: 2018-09-04 | Stop reason: HOSPADM

## 2018-09-04 RX ORDER — ONDANSETRON 2 MG/ML
4 INJECTION INTRAMUSCULAR; INTRAVENOUS DAILY PRN
Status: DISCONTINUED | OUTPATIENT
Start: 2018-09-04 | End: 2018-09-04

## 2018-09-04 RX ORDER — PROMETHAZINE HYDROCHLORIDE 25 MG/ML
6.25 INJECTION, SOLUTION INTRAMUSCULAR; INTRAVENOUS ONCE AS NEEDED
Status: COMPLETED | OUTPATIENT
Start: 2018-09-04 | End: 2018-09-04

## 2018-09-04 RX ORDER — FENTANYL CITRATE 50 UG/ML
25 INJECTION, SOLUTION INTRAMUSCULAR; INTRAVENOUS EVERY 5 MIN PRN
Status: COMPLETED | OUTPATIENT
Start: 2018-09-04 | End: 2018-09-04

## 2018-09-04 RX ORDER — SODIUM CHLORIDE 0.9 % (FLUSH) 0.9 %
3 SYRINGE (ML) INJECTION
Status: DISCONTINUED | OUTPATIENT
Start: 2018-09-04 | End: 2018-09-04 | Stop reason: HOSPADM

## 2018-09-04 RX ORDER — PROPOFOL 10 MG/ML
VIAL (ML) INTRAVENOUS
Status: DISCONTINUED | OUTPATIENT
Start: 2018-09-04 | End: 2018-09-04

## 2018-09-04 RX ORDER — HYDROMORPHONE HYDROCHLORIDE 2 MG/ML
0.4 INJECTION, SOLUTION INTRAMUSCULAR; INTRAVENOUS; SUBCUTANEOUS EVERY 5 MIN PRN
Status: DISCONTINUED | OUTPATIENT
Start: 2018-09-04 | End: 2018-09-04

## 2018-09-04 RX ORDER — BUPIVACAINE HYDROCHLORIDE 2.5 MG/ML
INJECTION, SOLUTION EPIDURAL; INFILTRATION; INTRACAUDAL
Status: DISCONTINUED | OUTPATIENT
Start: 2018-09-04 | End: 2018-09-04 | Stop reason: HOSPADM

## 2018-09-04 RX ORDER — ROCURONIUM BROMIDE 10 MG/ML
INJECTION, SOLUTION INTRAVENOUS
Status: DISCONTINUED | OUTPATIENT
Start: 2018-09-04 | End: 2018-09-04

## 2018-09-04 RX ORDER — FAMOTIDINE 20 MG/1
20 TABLET, FILM COATED ORAL
Status: COMPLETED | OUTPATIENT
Start: 2018-09-04 | End: 2018-09-04

## 2018-09-04 RX ORDER — LIDOCAINE HCL/PF 100 MG/5ML
SYRINGE (ML) INTRAVENOUS
Status: DISCONTINUED | OUTPATIENT
Start: 2018-09-04 | End: 2018-09-04

## 2018-09-04 RX ORDER — DIPHENHYDRAMINE HYDROCHLORIDE 50 MG/ML
25 INJECTION INTRAMUSCULAR; INTRAVENOUS EVERY 6 HOURS PRN
Status: DISCONTINUED | OUTPATIENT
Start: 2018-09-04 | End: 2018-09-04

## 2018-09-04 RX ORDER — ACETAMINOPHEN 10 MG/ML
INJECTION, SOLUTION INTRAVENOUS
Status: DISCONTINUED | OUTPATIENT
Start: 2018-09-04 | End: 2018-09-04

## 2018-09-04 RX ORDER — HYDROMORPHONE HYDROCHLORIDE 2 MG/ML
1 INJECTION, SOLUTION INTRAMUSCULAR; INTRAVENOUS; SUBCUTANEOUS EVERY 4 HOURS PRN
Status: DISCONTINUED | OUTPATIENT
Start: 2018-09-04 | End: 2018-09-04 | Stop reason: HOSPADM

## 2018-09-04 RX ORDER — VASOPRESSIN 20 [USP'U]/ML
INJECTION, SOLUTION INTRAMUSCULAR; SUBCUTANEOUS
Status: DISCONTINUED | OUTPATIENT
Start: 2018-09-04 | End: 2018-09-04 | Stop reason: HOSPADM

## 2018-09-04 RX ORDER — ONDANSETRON 8 MG/1
8 TABLET, ORALLY DISINTEGRATING ORAL EVERY 8 HOURS PRN
Status: DISCONTINUED | OUTPATIENT
Start: 2018-09-04 | End: 2018-09-04 | Stop reason: HOSPADM

## 2018-09-04 RX ADMIN — FENTANYL CITRATE 100 MCG: 50 INJECTION, SOLUTION INTRAMUSCULAR; INTRAVENOUS at 07:09

## 2018-09-04 RX ADMIN — CEFAZOLIN SODIUM 3 ML: 2 SOLUTION INTRAVENOUS at 07:09

## 2018-09-04 RX ADMIN — FENTANYL CITRATE 25 MCG: 50 INJECTION, SOLUTION INTRAMUSCULAR; INTRAVENOUS at 11:09

## 2018-09-04 RX ADMIN — FENTANYL CITRATE 25 MCG: 50 INJECTION, SOLUTION INTRAMUSCULAR; INTRAVENOUS at 10:09

## 2018-09-04 RX ADMIN — CARBOXYMETHYLCELLULOSE SODIUM 2 DROP: 2.5 SOLUTION/ DROPS OPHTHALMIC at 07:09

## 2018-09-04 RX ADMIN — ONDANSETRON 4 MG: 2 INJECTION, SOLUTION INTRAMUSCULAR; INTRAVENOUS at 10:09

## 2018-09-04 RX ADMIN — FENTANYL CITRATE 50 MCG: 50 INJECTION, SOLUTION INTRAMUSCULAR; INTRAVENOUS at 08:09

## 2018-09-04 RX ADMIN — GLYCOPYRROLATE 0.8 MG: 0.2 INJECTION, SOLUTION INTRAMUSCULAR; INTRAVENOUS at 09:09

## 2018-09-04 RX ADMIN — FAMOTIDINE 20 MG: 20 TABLET ORAL at 06:09

## 2018-09-04 RX ADMIN — NEOSTIGMINE METHYLSULFATE 5 MG: 1 INJECTION INTRAVENOUS at 09:09

## 2018-09-04 RX ADMIN — SODIUM CHLORIDE, SODIUM LACTATE, POTASSIUM CHLORIDE, AND CALCIUM CHLORIDE: 600; 310; 30; 20 INJECTION, SOLUTION INTRAVENOUS at 06:09

## 2018-09-04 RX ADMIN — ALBUMIN (HUMAN): 2.5 SOLUTION INTRAVENOUS at 08:09

## 2018-09-04 RX ADMIN — ROCURONIUM BROMIDE 10 MG: 10 INJECTION INTRAVENOUS at 07:09

## 2018-09-04 RX ADMIN — PREGABALIN 150 MG: 75 CAPSULE ORAL at 01:09

## 2018-09-04 RX ADMIN — ACETAMINOPHEN 1000 MG: 10 INJECTION, SOLUTION INTRAVENOUS at 09:09

## 2018-09-04 RX ADMIN — SUCCINYLCHOLINE CHLORIDE 160 MG: 20 INJECTION, SOLUTION INTRAMUSCULAR; INTRAVENOUS at 07:09

## 2018-09-04 RX ADMIN — ROCURONIUM BROMIDE 10 MG: 10 INJECTION INTRAVENOUS at 09:09

## 2018-09-04 RX ADMIN — KETOROLAC TROMETHAMINE 30 MG: 30 INJECTION, SOLUTION INTRAMUSCULAR; INTRAVENOUS at 09:09

## 2018-09-04 RX ADMIN — ONDANSETRON 4 MG: 2 INJECTION INTRAMUSCULAR; INTRAVENOUS at 07:09

## 2018-09-04 RX ADMIN — OXYCODONE HYDROCHLORIDE 5 MG: 5 TABLET ORAL at 11:09

## 2018-09-04 RX ADMIN — GLYCOPYRROLATE 0.2 MG: 0.2 INJECTION, SOLUTION INTRAMUSCULAR; INTRAVENOUS at 07:09

## 2018-09-04 RX ADMIN — PROPOFOL 200 MG: 10 INJECTION, EMULSION INTRAVENOUS at 07:09

## 2018-09-04 RX ADMIN — FENTANYL CITRATE 50 MCG: 50 INJECTION, SOLUTION INTRAMUSCULAR; INTRAVENOUS at 10:09

## 2018-09-04 RX ADMIN — PROMETHAZINE HYDROCHLORIDE 6.25 MG: 25 INJECTION INTRAMUSCULAR; INTRAVENOUS at 12:09

## 2018-09-04 RX ADMIN — LIDOCAINE HYDROCHLORIDE 100 MG: 20 INJECTION, SOLUTION INTRAVENOUS at 07:09

## 2018-09-04 RX ADMIN — ROCURONIUM BROMIDE 40 MG: 10 INJECTION INTRAVENOUS at 07:09

## 2018-09-04 RX ADMIN — SCOPOLAMINE 1 PATCH: 1 PATCH, EXTENDED RELEASE TRANSDERMAL at 06:09

## 2018-09-04 NOTE — TRANSFER OF CARE
Anesthesia Transfer of Care Note    Patient: Annie Hernandez    Procedure(s) Performed: Procedure(s) (LRB):  ROBOTIC MYOMECTOMY, UTERUS (N/A)  MYOMECTOMY, HYSTEROSCOPIC (N/A)    Patient location: PACU    Anesthesia Type: general    Transport from OR: Transported from OR on 2-3 L/min O2 by NC with adequate spontaneous ventilation    Post pain: adequate analgesia    Post assessment: no apparent anesthetic complications    Post vital signs: stable    Level of consciousness: awake, alert and oriented    Nausea/Vomiting: no nausea/vomiting    Complications: none    Transfer of care protocol was followed      Last vitals:   Visit Vitals  /67 (BP Location: Right arm, Patient Position: Lying)   Pulse 73   Temp 36.8 °C (98.3 °F) (Oral)   Resp 16   Ht 6' (1.829 m)   Wt (!) 150.6 kg (331 lb 16 oz)   LMP 08/12/2018   SpO2 95%   Breastfeeding? No   BMI 45.03 kg/m²

## 2018-09-04 NOTE — DISCHARGE INSTRUCTIONS
FOLLOW ANY INSTRUCTIONS GIVEN PER DR. MCKINNEY!!!!!    NOTIFY MD OF :  Excessive vaginal bleeding (soaking through more than 1 pad per hour) or incisional bleeding (soaking though dressing within 1 hour).      Having Robotic-Assisted Myomectomy  Robotic-assisted myomectomy is a type of surgery. Its done to remove growths in a womens womb (uterus) called fibroid tumors. The tumors are not cancer. The surgery is done with special tools and a robotic controller.  What to tell your healthcare provider  Tell your healthcare provider about all the medicines you take. This includes over-the-counter medicines such as ibuprofen. It also includes vitamins, herbs, and other supplements. And tell your healthcare provider if you:  · Have had any recent changes in your health, such as an infection or fever  · Are sensitive or allergic to any medicines, latex, tape, or anesthesia (local and general)  · Are pregnant or think you may be pregnant  · Plan to get pregnant after having this surgery  Tests before your surgery  Before your surgery, a healthcare provider will ask you questions about your health. He or she will also examine you. This includes checking your heart and lungs. You may need tests such as:  · Electrocardiogram to check your heart rhythm  · Ultrasound exam of your pelvis to look at your fibroids  · MRI to get more information about your fibroids  · Blood tests to check your overall health  Getting ready for your surgery  Talk with your healthcare provider how to get ready for your surgery. You may need to stop taking some medicines before the procedure, such as blood thinners and aspirin. If you smoke, you may need to stop before your surgery. Smoking can delay healing. Talk with your healthcare provider if you need help to stop smoking.  Also, make sure to:  · Ask a family member or friend to take you home from the hospital  · Not eat or drink after midnight the night before your surgery  · Follow all other  instructions from your healthcare provider  You will be asked to sign a consent form that gives your permission to do the procedure. Read the form carefully. Ask questions if something is not clear.  On the day of your surgery  Your doctor will explain the details of your surgery. Your surgery will be done by an obstetrician/gynecologist (OB/GYN) surgeon. He or she will work with a team of specialized nurses. The surgery can be done in several ways. Ask your doctor about the details of your surgery. The whole procedure may take a couple of hours. In general, you can expect the following:  · You will have general anesthesia, a medicine that allows you to sleep through the surgery. You wont feel any pain during the surgery.  · A healthcare provider will watch your vital signs, like your heart rate and blood pressure, during the surgery.  · You may be given antibiotics during and after the surgery. This is to help prevent infection.  · After cleaning your skin, the surgeon will make a few small cuts (incisions) in your abdomen.  · A small tube may be used to send some gas into your abdomen. This helps the surgeon see the area better during surgery.  · the surgeon will pass tools through the small incisions. These include a tiny camera with a light, and several robotic tools. The robotic tools allow the surgeon to make very small movements.  · The surgeon will use the robotic controller to move the tools and remove the fibroids.  · When the surgery is done, the tools will be removed. The incisions will be closed and bandaged.  After your surgery  After the surgery, a healthcare provider will watch your vital signs. He or she will also check your incisions. You may be able to go home the same day. Or you may need to stay overnight.  Recovering at home  Make sure you move around as much as possible after your surgery. This helps to prevent problems like blood clots. You may have some pain after the surgery. This includes  pain in your shoulder from the gas used during surgery. Your healthcare provider will tell you what to take for pain.  Follow-up care  Make sure you follow all of your healthcare providers instructions. Dont miss any of your follow-up appointments. Your fibroid symptoms may go away after surgery. Fibroids can grow back or new ones can form. Talk with your healthcare provider if your symptoms return. Tell your provider if you get pregnant after having this surgery.  When to call your healthcare provider  Call your healthcare provider right away if you have any of these:  · Fever of 100.4°F (38.0°C) or higher  · Pain that is getting worse  · Redness or warmth near the incisions  · Vaginal bleeding  · Lots of fluid leaking from your incisions   Date Last Reviewed: 8/10/2015  © 1055-6414 [employer]. [address] [place] [address], [place] [address]. All rights reserved. This information is not intended as a substitute for professional medical care. Always follow your healthcare professional's instructions.                  Anesthesia: After Your Surgery  Youve just had surgery. During surgery, you received medication called anesthesia to keep you comfortable and pain-free. After surgery, you may experience some pain or nausea. This is common. Here are some tips for feeling better and recovering after surgery.    Going home  Your doctor or nurse will show you how to take care of yourself when you go home. He or she will also answer your questions. Have an adult family member or friend drive you home. For the first 24 hours after your surgery:  · Do not drive or use heavy equipment.  · Do not make important decisions or sign legal documents.  · Avoid alcohol.  · Have someone stay with you, if needed. He or she can watch for problems and help keep you safe.  Be sure to keep all follow-up appointments with your doctor. And rest after your procedure for as long as your doctor tells you to.    Coping with pain  If  you have pain after surgery, pain medication will help you feel better. Take it as directed, before pain becomes severe. Also, ask your doctor or pharmacist about other ways to control pain, such as with heat, ice, and relaxation. And follow any other instructions your surgeon or nurse gives you.    Tips for taking pain medication  To get the best relief possible, remember these points:  · Pain medications can upset your stomach. Taking them with a little food may help.  · Most pain relievers taken by mouth need at least 20 to 30 minutes to take effect.  · Taking medication on a schedule can help you remember to take it. Try to time your medication so that you can take it before beginning an activity, such as dressing, walking, or sitting down for dinner.  · Constipation is a common side effect of pain medications. Contact your doctor before taking any medications like laxatives or stool softeners to help relieve constipation. Also ask about any dietary restrictions, because drinking lots of fluids and eating foods like fruits and vegetables that are high in fiber can also help. Remember, dont take laxatives unless your surgeon has prescribed them.  · Mixing alcohol and pain medication can cause dizziness and slow your breathing. It can even be fatal. Dont drink alcohol while taking pain medication.  · Pain medication can slow your reflexes. Dont drive or operate machinery while taking pain medication.  If your health care provider tells you to take acetaminophen to help relieve your pain, ask him or her how much you are supposed to take each day. (Acetaminophen is the generic name for Tylenol and other brand-name pain relievers.) Acetaminophen or other pain relievers may interact with your prescription medicines or other over-the-counter (OTC) drugs. Some prescription medications contain acetaminophen along with other active ingredients. Using both prescription and OTC acetaminophen for pain can cause you to  overdose. The FDA recommends that you read the labels on your OTC medications carefully. This will help you to clearly understand the list of active ingredients, dosing instructions, and any warnings. It may also help you avoid taking too much acetaminophen. If you have questions or don't understand the information, ask your pharmacist or health care provider to explain it to you before you take the OTC medication.    Managing nausea  Some people have an upset stomach after surgery. This is often due to anesthesia, pain, pain medications, or the stress of surgery. The following tips will help you manage nausea and get good nutrition as you recover. If you were on a special diet before surgery, ask your doctor if you should follow it during recovery. These tips may help:  · Dont push yourself to eat. Your body will tell you when to eat and how much.  · Start off with clear liquids and soup. They are easier to digest.  · Progress to semi-solid foods (mashed potatoes, applesauce, and gelatin) as you feel ready.  · Slowly move to solid foods. Dont eat fatty, rich, or spicy foods at first.  · Dont force yourself to have three large meals a day. Instead, eat smaller amounts more often.  · Take pain medications with a small amount of solid food, such as crackers or toast to avoid nausea.      Call your surgeon if  · You still have pain an hour after taking medication (it may not be strong enough).  · You feel too sleepy, dizzy, or groggy (medication may be too strong).  · You have side effects like nausea, vomiting, or skin changes (rash, itching, or hives).   © 4960-9403 [employer]. [address] [place] [address], [place] [address]. All rights reserved. This information is not intended as a substitute for professional medical care. Always follow your healthcare professional's instructions.        Pelvic Laparoscopy    Laparoscopy is a type of surgery done using very small incisions. This type of surgery is  possible because of the laparoscope (a long, slender tool with a camera and light). It lets your surgeon see inside the stomach. To perform the surgery, special instruments are inserted into the stomach through small incisions. Pelvic laparoscopy is often used to diagnose and treat the causes of pelvic problems, such as pain and infertility. Laparoscopy often involves:  · A short hospital stay. (You can most likely go home the same day.)  · A quick recovery  · Minimal anesthesia  · Small external scars  · Mild to moderate postoperative pain  Getting ready  To prepare for surgery:  · Tell your surgeon about any medicines you take. Include herbs, supplements, and over-the-counter medicines. You may need to stop taking certain medicines, such as aspirin, for 7 to 10 days before surgery.  · Do not eat or drink anything after the midnight before surgery.  · Arrange for a ride home after surgery.  Before the procedure  You will most likely be given general anesthesia to make you sleep during the procedure. A catheter may be inserted to drain urine from the bladder.   How pelvic laparoscopy is done  One or more small (quarter- or half-inch) incisions are made near the navel or the pubic hairline, or on either side of the lower belly. The laparoscope is inserted through an incision. It sends images to a video screen, allowing the surgeon a close-up view of the organs. Gas is used to inflate the belly, allowing the surgeon room to see and work. Depending on what is found, surgery to treat the problem may be done at this time.  After the procedure  · Youll be taken to a post-op area to wake up and recover from anesthesia.  · You may feel some shoulder pain. This is due to irritation from the gas used to inflate the belly.  · You may have some discharge from the vagina. If so, ask the nurse for a pad.  · You will be asked to walk around to improve breathing and blood flow.  · If you had a catheter, it will most likely be  removed before you go home.  · You can go home as soon as you recover from anesthesia and your condition is stable.  Your recovery  Recovery time depends on which procedure was done through the laparoscope. Your recovery from pelvic laparoscopy may take up to 6 weeks. If it was a simple procedure, like tubal ligation, then 2 weeks is reasonable. For laparoscopic hysterectomies, the recovery may be closer to 6 weeks. While you recover, be sure to follow your healthcare providers instructions. During this time:  · Take pain medicine as prescribed.  · Start eating solid food when you feel ready. To avoid constipation, eat fruits, vegetables, and whole grains. Drink plenty of fluids.  · Dont lift anything heavy until your healthcare provider says its safe.  · Take it easy for a few days. Ask your healthcare provider when you can return to work, exercise, and sex.  · Arrange for a follow-up visit with your healthcare provider to discuss the results of the procedure.  Call your healthcare provider  Contact your healthcare provider right away if you have any of the following:  · Have chills, or a fever of 100.4°F (38°C) or higher, or as directed by your healthcare provider  · Notice that the incision is red, swollen, or draining  · Have heavy, bright-red vaginal bleeding or a smelly discharge  · Have difficulty urinating  · Experience severe belly pain or bloating  · Have leg pain, redness, or swelling  · Have persistent nausea or vomiting  · Are not improving daily  · Fainting  · Trouble breathing   Date Last Reviewed: 12/1/2016  © 1776-3148 [employer]. [address] [place] [address], [place] [address]. All rights reserved. This information is not intended as a substitute for professional medical care. Always follow your healthcare professional's instructions.

## 2018-09-04 NOTE — ANESTHESIA POSTPROCEDURE EVALUATION
Anesthesia Post Evaluation    Patient: Annie Hernandez    Procedure(s) Performed: Procedure(s) (LRB):  ROBOTIC MYOMECTOMY, UTERUS (N/A)  MYOMECTOMY, HYSTEROSCOPIC (N/A)    Final Anesthesia Type: general  Patient location during evaluation: PACU  Patient participation: Yes- Able to Participate  Level of consciousness: awake and alert  Post-procedure vital signs: reviewed and stable  Pain management: adequate  Airway patency: patent  PONV status at discharge: No PONV  Anesthetic complications: no      Cardiovascular status: blood pressure returned to baseline  Respiratory status: unassisted, spontaneous ventilation and room air  Hydration status: euvolemic  Follow-up not needed.        Visit Vitals  /80 (BP Location: Right arm, Patient Position: Lying)   Pulse 70   Temp 36.8 °C (98.3 °F) (Oral)   Resp 18   Ht 6' (1.829 m)   Wt (!) 150.6 kg (331 lb 16 oz)   LMP 08/12/2018   SpO2 (!) 94%   Breastfeeding? No   BMI 45.03 kg/m²       Pain/Anitra Score: Pain Assessment Performed: Yes (9/4/2018 11:57 AM)  Presence of Pain: complains of pain/discomfort (9/4/2018 11:57 AM)  Pain Rating Prior to Med Admin: 6 (9/4/2018 11:17 AM)  Pain Rating Post Med Admin: 4 (9/4/2018 11:45 AM)  Anitra Score: 9 (9/4/2018 11:57 AM)

## 2018-09-04 NOTE — OP NOTE
OPERATIVE REPORT     Surgery Date: 09/04/2018     SURGEON: Tiffani Minaya    ASSISTANT: Aster Smyth NP (no qualified resident was available)    PREOP DIAGNOSIS:   1. Menorrhagia  2. Fibroids    POSTOP DIAGNOSIS:    1. Menorrhagia  2. Fibroids    PROCEDURES:   1. Hysteroscopic myomectomy (1 fibroid)  2. Robotic assisted laparoscopic myomectomy (3 fibroids)    ANESTHESIA: general    FINDINGS/KEY COMPONENTS: Normal size uterus with 3 intramural/submucosal fibroids, 1 submucosal fibroid    ESTIMATED BLOOD LOSS: 20 cc    COMPLICATIONS: None    IV FLUIDS: 1500 cc    URINE OUTPUT: 700 cc    PROCEDURE: Patient was taking to the operating room where general anesthesia was administered and found to be adequate.  She was prepped and draped in the dorsal lithotomy position.  A weighted sterile speculum was placed in the vagina.  The anterior lip of the cervix was grasped with a single tooth tenaculum.  The uterus was sounded to approximately 10 cm.  A hysteroscope was advanced through the cervix.  The endometrium was inspected.  Multiple submucosal fibroids were noted but they also had an intramural component.  Hysteroscope was removed.  A ADILENE manipulator was placed inside the uterine cavity.  A lovelace catheter was inserted into the bladder.    Gloves were changed.  A Veress needle was inserted into the umbilicus under tenting of the anterior abdominal wall.  Placement into the peritoneal cavity was confirmed via saline drop test.  The abdomen was insufflated to 15mm Hg using Carbon dioxide.  A 8 mm supraumbilical skin incision was made with the scalpel.  A 8 mm trocar was advanced through this incision.  The camera was placed through the trocar.  Excellent hemostasis was noted.  The patient was placed in deep Trendelenburg.  An 8 mm left lateral skin incision was made with a scalpel and an 8 mm trocar was advanced through this incision under visualization of the camera.  A 12 mm left upper quadrant incision was made  with the scalpel.  A 12 mm AirSeal trocar was advanced through the incision under visualization of the camera.  An 8 mm right lateral skin incision was made with the scalpel.  An 8 mm trocar was advanced through this incision under visualization of the camera.  Attention was turned to the right upper quadrant.  An 8 mm skin incision was made with the scalpel and an 8 mm trocar was advanced through this incision under visualization of the camera.  Excellent hemostasis was noted.  The robot was docked into place.  Instruments were placed.  The surgeon moved to the console for the procedure.      MRI images were viewed at the time of the surgery.  Decision was made based on the images to make an incision in the right fundal portion of the uterus.  All fibroids were removed in the following fashion: 20 Units of Pitressin diluted in 100cc of Normal saline was injected into the serosa overlying the fibroid.  An incision was made with the carbon dioxide laser over the serosa of the fibroid.  This incision was carried down to the fibroid with the laser.  The fibroid was grasped with a laparoscopic single tooth tenaculum.  Using the bipolar [place] and the laser, the fibroid was enucleated from the underlying myometrium.  Hemostasis was maintained utilizing the laser.  Once the fibroid was completely enucleated, it was placed along a suture and placed in the pericolic peritoneum for removal later.  Three fibroids were removed in this manner.  The deepest layer of myometrium was reapproximated with 180 V-Lock suture in a running fashion.  The serosa was reapproximated with 180 V-Lock in a baseball stitch fashion.  Excellent hemostasis was noted.      The uterus was copiously irrigated.  Excellent hemostasis was noted.  The MRI images were reviewed.  A small posterior submucosal fibroid was noted.  The 12 mm umbilical trocar was removed and a morcellator was advanced through this incision under visualization of the camera.   The needle and suture with the fibroids was grasped.  The suture was cut and the needle was removed.  Using morcellation, the fibroid was removed from the abdominal cavity.  The abdomen was inspected.  There was no evidence of pieces of fibroid.  The abdomen was found to free of injury.  Surgiflo was placed inside the abdominal cavity.      The ADILENE was removed.  Hysteroscope was again advanced through the cervix.  The posterior fibroid was noted.  Using the ultramini Truclear device, the fibroid was morcellated and removed.  This was done under concomitant visualization of the robotic camera.  The hysteroscope was removed.      Attention was turned again to the anterior abdomen.  The fascia of the 12 mm incisions was re-approximated with 0-Vicryl in a Ector-Sary fashion.  The abdomen was deflated.  Patient was given 5 deep breaths.  The trocars were removed.  The skin was closed with 4-0 Monocryl in a subcuticular fashion.  The incisions were injected with .25% Marcaine.  The lovelace was removed.   Sponge, lap, and needle counts were correct x 2.  The patient was taken to the recovery room in stable condition.

## 2018-09-04 NOTE — BRIEF OP NOTE
BRIEF OPERATIVE NOTE       SUMMARY      Surgery Date: 09/04/2018     SURGEON: Tiffani Minaya    ASSISTANT: Aster Smyth NP (no qualified resident was available)    PREOP DIAGNOSIS:   1. Menorrhagia  2. Fibroids    POSTOP DIAGNOSIS:    1. Menorrhagia  2. Fibroids    PROCEDURES:   1. Hysteroscopic myomectomy (1 fibroid)  2. Robotic assisted laparoscopic myomectomy (3 fibroids)    ANESTHESIA: general    FINDINGS/KEY COMPONENTS: Normal size uterus with 3 intramural/submucosal fibroids, 1 submucosal fibroid    ESTIMATED BLOOD LOSS: 20 cc    COMPLICATIONS: None    PATHOLOGY:   Specimens (From admission, onward)    Start     Ordered    09/04/18 0948  Specimen to Pathology - Surgery  Once     Start Status   09/04/18 0948 Collected (09/04/18 0947)       09/04/18 0947

## 2018-09-04 NOTE — INTERVAL H&P NOTE
The patient has been examined and the H&P has been reviewed:    I concur with the findings and no changes have occurred since H&P was written.    Surgery risks, benefits and alternative options discussed and understood by patient/family.      Active Hospital Problems    Diagnosis  POA    Submucous uterine fibroid [D25.0]  Yes      Resolved Hospital Problems   No resolved problems to display.     Felicitas De La Garza M.D.  PGY-4 ObGyn  [phone]

## 2018-09-04 NOTE — OR NURSING
Report to CHRISTINA Kennedy; vs stable; dressings intact; pt stating pain still 5-6/10 to left side;

## 2018-09-05 ENCOUNTER — TELEPHONE (OUTPATIENT)
Dept: OBSTETRICS AND GYNECOLOGY | Facility: CLINIC | Age: 38
End: 2018-09-05

## 2018-09-05 NOTE — TELEPHONE ENCOUNTER
Contacted the pt to schedule post-op appointment. Pt requesting that her post op be in two weeks because she needs to return to work. Informed the pt that she can schedule for two weeks but that if Dr. Minaya believes she needs to be out longer she won't clear her to return to work. Pt verbalized understanding and agreed to an appointment on 9/20 at 8:30AM.

## 2018-09-06 ENCOUNTER — TELEPHONE (OUTPATIENT)
Dept: OBSTETRICS AND GYNECOLOGY | Facility: CLINIC | Age: 38
End: 2018-09-06

## 2018-09-06 NOTE — TELEPHONE ENCOUNTER
Returned the pt's call to the clinic. Pt stated that she has a cut below her incision underneath her belly button and would like to know if she can put Neosporin on it. Pt also would like to know if it is okay to take her bandages off. Informed the pt per Dr. Minaya that it is okay to put Neosporin on the cut underneath her belly button and to take her bandages off. Pt informed not to take of her steri strips. Pt verbalized understanding.

## 2018-09-06 NOTE — DISCHARGE SUMMARY
[facility]  Discharge Note  Short Stay    Admit Date: 9/4/2018    Discharge Date and Time: 9/4/2018  2:50 PM     Attending Physician: Tiffani Minaya MD    Discharge Provider: Tiffani Minaya    Diagnoses:  Active Hospital Problems    Diagnosis  POA    *Status post robot-assisted surgical procedure [Z98.890]  Not Applicable    S/P RA myomectomy/ hysteroscopy [Z98.890]  Not Applicable    Submucous uterine fibroid [D25.0]  Yes      Resolved Hospital Problems   No resolved problems to display.       Discharged Condition: good    Hospital Course: Patient was admitted for an outpatient procedure and tolerated the procedure well with no complications.    Final Diagnoses: Same as principal problem.    Disposition: Home or Self Care    Follow up/Patient Instructions:    Medications:  Reconciled Home Medications:      Medication List      START taking these medications    ibuprofen 600 MG tablet  Commonly known as:  ADVIL,MOTRIN  Take 1 tablet (600 mg total) by mouth every 6 (six) hours as needed for Pain.     oxyCODONE-acetaminophen 5-325 mg per tablet  Commonly known as:  PERCOCET  Take 1 tablet by mouth every 4 (four) hours as needed.          No discharge procedures on file.  Follow-up Information     Tiffani Minaya MD.    Specialties:  Obstetrics, Obstetrics and Gynecology  Contact information:  [address] [id number] [phone]                   Discharge Procedure Orders (must include Diet, Follow-up, Activity):  No discharge procedures on file.

## 2018-09-06 NOTE — TELEPHONE ENCOUNTER
----- Message from Gwendolyn Mg sent at 9/6/2018 12:25 PM CDT -----  Contact: CHANTEL FLORAIN [[id number]]            Name of Who is Calling: CHANTEL FLORIAN [[id number]]      What is the request in detail: Pt is returning Dr Minaya call and says that outside of some pain she's doing fine but would like to know if she can remove the Band-Aids.  Please contact pt to further discuss and advise.       Can the clinic reply by [facility]: Yes    What [facility]: [phone]

## 2018-09-09 ENCOUNTER — PATIENT MESSAGE (OUTPATIENT)
Dept: OBSTETRICS AND GYNECOLOGY | Facility: CLINIC | Age: 38
End: 2018-09-09

## 2018-09-10 ENCOUNTER — TELEPHONE (OUTPATIENT)
Dept: OBSTETRICS AND GYNECOLOGY | Facility: CLINIC | Age: 38
End: 2018-09-10

## 2018-09-10 NOTE — TELEPHONE ENCOUNTER
Contacted the pt with Dr. Minaya's recommendations. Pt informed that she can use warm compress and that the left upper quadrant of the abdomen is the most sensitive after Sx. Patient informed that Dr. Minaya offered to increase her Ibuprofen to 800mg. Patient will discontinue Percocet but she doesn't want the 800mg Ibuprofen at this time. The pt stated that she will try warm compress first. Pt instructed to contact the clinic with any other questions or concerns. Pt verbalized understanding.

## 2018-09-10 NOTE — TELEPHONE ENCOUNTER
Returned the pt's call to the clinic. Pt stated that she was having more pain on her upper left incision more than the others and would like to know if she can put a heating pad on it. Inquired if the patient is taking her pain medications as prescribed alternating between the two. Patient stated that she has not and that the Percocet makes her itch and dizzy so she only takes it about once a day. Pt is passing gas and making BM regularly. Pt informed that her concerns will be forwarded to Dr. Minaya for her recommendations and that she will be contacted once Dr. Minaya responds. Patient verbalized understanding.    Please advise. Can this patient use a heating pad on her upper left abdominal area? Percocet causing itching and dizziness so patient not taking as prescribed.

## 2018-09-10 NOTE — TELEPHONE ENCOUNTER
----- Message from Herminia Ulrich sent at 9/10/2018  9:20 AM CDT -----  Contact: CHANTEL FLORIAN [[id number]]            Name of Who is Calling: CHANTEL FLORIAN [[id number]]      What is the request in detail: patient would like a call back regarding [facility] message and also would like to know can she put a heating pad on incision. Please call       Can the clinic reply by [facility]: no      What Number to Call Back if not in [facility]: [phone]

## 2018-09-10 NOTE — TELEPHONE ENCOUNTER
She can use a warm heating pad.  This area is the most sensitive area after surgery.  She can take the Ibuprofen.  I can change it to 800 mg if she would like

## 2018-09-12 ENCOUNTER — TELEPHONE (OUTPATIENT)
Dept: OBSTETRICS AND GYNECOLOGY | Facility: CLINIC | Age: 38
End: 2018-09-12

## 2018-09-12 NOTE — TELEPHONE ENCOUNTER
Returned the pt's call to the clinic. Pt c/o passing blood clots that are about the size of a quarter. Pt stated that she is a little concerned that she went for spotting to heavier bleeding. Pt denies saturating a pad or more in one hour. Pt stated that she is bleeding as if she was on her cycle. Pt informed that she can experience some bleeding throughout her post op period and that she should report to the ER if she begins to saturate more than one pad in an hour. Pt instructed to contact the clinic if she has any other questions or concerns. Patient verbalized understanding.    Any further recommendations for this patient?

## 2018-09-12 NOTE — TELEPHONE ENCOUNTER
----- Message from Viola Renee sent at 9/12/2018  9:52 AM CDT -----  Contact: Pt  Name of Who is Calling: CHANTEL FLORIAN [[id number]]    What is the request in detail: Patient states post surgery she is experiencing blood clots patient is requesting to speak with the staff regarding questions she has.........Please contact to further discuss and advise       Can the clinic reply by [facility]: No      What Number to Call Back if not in [facility]: [phone]

## 2018-09-20 ENCOUNTER — OFFICE VISIT (OUTPATIENT)
Dept: OBSTETRICS AND GYNECOLOGY | Facility: CLINIC | Age: 38
End: 2018-09-20
Attending: OBSTETRICS & GYNECOLOGY
Payer: COMMERCIAL

## 2018-09-20 VITALS
HEIGHT: 72 IN | DIASTOLIC BLOOD PRESSURE: 90 MMHG | BODY MASS INDEX: 39.68 KG/M2 | WEIGHT: 293 LBS | SYSTOLIC BLOOD PRESSURE: 118 MMHG

## 2018-09-20 DIAGNOSIS — Z09 POSTOP CHECK: Primary | ICD-10-CM

## 2018-09-20 DIAGNOSIS — Z98.890 STATUS POST ROBOT-ASSISTED SURGICAL PROCEDURE: ICD-10-CM

## 2018-09-20 PROBLEM — D25.0 SUBMUCOUS LEIOMYOMA OF UTERUS: Status: RESOLVED | Noted: 2017-10-04 | Resolved: 2018-09-20

## 2018-09-20 PROBLEM — D25.0 SUBMUCOUS UTERINE FIBROID: Status: RESOLVED | Noted: 2018-06-15 | Resolved: 2018-09-20

## 2018-09-20 PROCEDURE — 99999 PR PBB SHADOW E&M-EST. PATIENT-LVL III: CPT | Mod: PBBFAC,,, | Performed by: OBSTETRICS & GYNECOLOGY

## 2018-09-20 PROCEDURE — 99024 POSTOP FOLLOW-UP VISIT: CPT | Mod: S$GLB,,, | Performed by: OBSTETRICS & GYNECOLOGY

## 2018-09-20 RX ORDER — IBUPROFEN 800 MG/1
800 TABLET ORAL EVERY 8 HOURS PRN
Qty: 30 TABLET | Refills: 2 | Status: SHIPPED | OUTPATIENT
Start: 2018-09-20 | End: 2019-09-20

## 2018-09-20 NOTE — PROGRESS NOTES
CC: Postoperative visit    Annie Hernandez is a 38 y.o. female  presents for a postoperative visit s/p Robotic assisted laparoscopic myomectomy on 2018.  Her postoperative course was uncomplicated.  She is doing well postoperative.    Reports some constipation.  Has a URI now.      Pathology showed:  FINAL PATHOLOGIC DIAGNOSIS  FRAGMENTS OF LEIOMYOMA AND SECRETORY ENDOMETRIUM.    BP (!) 118/90 (BP Location: Right arm, Patient Position: Sitting, BP Method: Large (Manual))   Ht 6' (1.829 m)   Wt (!) 147.6 kg (325 lb 6.4 oz)   BMI 44.13 kg/m²     ROS:  GENERAL: No fever, chills, fatigability or weight loss.  VULVAR: No pain, no lesions and no itching.  VAGINAL: No relaxation, no itching, no discharge, no abnormal bleeding and no lesions.  ABDOMEN: No abdominal pain. Denies nausea. Denies vomiting. No diarrhea. No constipation  BREAST: Denies pain. No lumps. No discharge.  URINARY: No incontinence, no nocturia, no frequency and no dysuria.  CARDIOVASCULAR: No chest pain. No shortness of breath. No leg cramps.  NEUROLOGICAL: No headaches. No vision changes.    Physical Exam    INCISIONS: clean, dry, intact.  Well healed  PELVIC: Normal external genitalia without lesions.  Normal hair distribution.  Adequate perineal body, normal urethral meatus.  Vagina moist and well rugated without lesions or discharge.  Cervix pink, without lesions, discharge or tenderness.  No significant cystocele or rectocele.  Bimanual exam shows uterus to be normal size, regular, mobile and nontender.  Adnexa without masses or tenderness.      1. Postop check  ibuprofen (ADVIL,MOTRIN) 800 MG tablet   2. Status post robot-assisted surgical procedure         Patient can return to normal activities in 2 additional weeks.  Discussed delaying conception for 4 months.    Discussed OTC meds for constipation and URI    Return to clinic in 1 year for well woman exam.

## 2018-10-11 ENCOUNTER — PATIENT MESSAGE (OUTPATIENT)
Dept: ADMINISTRATIVE | Facility: OTHER | Age: 38
End: 2018-10-11

## 2024-06-22 ENCOUNTER — HOSPITAL ENCOUNTER (EMERGENCY)
Facility: HOSPITAL | Age: 44
Discharge: HOME | End: 2024-06-22
Attending: EMERGENCY MEDICINE
Payer: COMMERCIAL

## 2024-06-22 VITALS
SYSTOLIC BLOOD PRESSURE: 143 MMHG | TEMPERATURE: 99 F | BODY MASS INDEX: 39.68 KG/M2 | HEIGHT: 72 IN | WEIGHT: 293 LBS | HEART RATE: 63 BPM | OXYGEN SATURATION: 99 % | DIASTOLIC BLOOD PRESSURE: 90 MMHG | RESPIRATION RATE: 18 BRPM

## 2024-06-22 DIAGNOSIS — M25.569 KNEE PAIN: ICD-10-CM

## 2024-06-22 DIAGNOSIS — M25.561 RIGHT KNEE PAIN, UNSPECIFIED CHRONICITY: Primary | ICD-10-CM

## 2024-06-22 LAB
BUN SERPL-MCNC: 28 MG/DL (ref 6–30)
CHLORIDE SERPL-SCNC: 107 MMOL/L (ref 95–110)
CREAT SERPL-MCNC: 0.9 MG/DL (ref 0.5–1.4)
GLUCOSE SERPL-MCNC: 104 MG/DL (ref 70–110)
HCT VFR BLD CALC: 38 %PCV (ref 36–54)
POC IONIZED CALCIUM: 1.23 MMOL/L (ref 1.06–1.42)
POC TCO2 (MEASURED): 25 MMOL/L (ref 23–29)
POTASSIUM BLD-SCNC: 3.8 MMOL/L (ref 3.5–5.1)
SAMPLE: NORMAL
SODIUM BLD-SCNC: 141 MMOL/L (ref 136–145)

## 2024-06-22 PROCEDURE — 80047 BASIC METABLC PNL IONIZED CA: CPT

## 2024-06-22 PROCEDURE — 25000003 PHARM REV CODE 250: Performed by: EMERGENCY MEDICINE

## 2024-06-22 PROCEDURE — 63600175 PHARM REV CODE 636 W HCPCS: Performed by: EMERGENCY MEDICINE

## 2024-06-22 PROCEDURE — 96372 THER/PROPH/DIAG INJ SC/IM: CPT | Performed by: EMERGENCY MEDICINE

## 2024-06-22 PROCEDURE — 99284 EMERGENCY DEPT VISIT MOD MDM: CPT | Mod: 25

## 2024-06-22 RX ORDER — HYDROCODONE BITARTRATE AND ACETAMINOPHEN 5; 325 MG/1; MG/1
1 TABLET ORAL
Status: COMPLETED | OUTPATIENT
Start: 2024-06-22 | End: 2024-06-22

## 2024-06-22 RX ORDER — KETOROLAC TROMETHAMINE 30 MG/ML
10 INJECTION, SOLUTION INTRAMUSCULAR; INTRAVENOUS
Status: COMPLETED | OUTPATIENT
Start: 2024-06-22 | End: 2024-06-22

## 2024-06-22 RX ORDER — NAPROXEN 500 MG/1
500 TABLET ORAL 2 TIMES DAILY WITH MEALS
Qty: 20 TABLET | Refills: 0 | Status: SHIPPED | OUTPATIENT
Start: 2024-06-22 | End: 2024-07-02

## 2024-06-22 RX ORDER — PREDNISONE 20 MG/1
40 TABLET ORAL DAILY
Qty: 8 TABLET | Refills: 0 | Status: SHIPPED | OUTPATIENT
Start: 2024-06-22 | End: 2024-06-26

## 2024-06-22 RX ORDER — PREDNISONE 20 MG/1
40 TABLET ORAL
Status: COMPLETED | OUTPATIENT
Start: 2024-06-22 | End: 2024-06-22

## 2024-06-22 RX ADMIN — HYDROCODONE BITARTRATE AND ACETAMINOPHEN 1 TABLET: 5; 325 TABLET ORAL at 02:06

## 2024-06-22 RX ADMIN — KETOROLAC TROMETHAMINE 10 MG: 30 INJECTION, SOLUTION INTRAMUSCULAR; INTRAVENOUS at 03:06

## 2024-06-22 RX ADMIN — PREDNISONE 40 MG: 20 TABLET ORAL at 03:06

## 2024-06-22 NOTE — DISCHARGE INSTRUCTIONS
Take tylenol if needed for pain  Take naproxen as prescribed.  Follow up with your doctor and orthopedic surgery  Return to ED for worsening pain, redness to knee, fevers or any other concerns

## 2024-06-22 NOTE — ED TRIAGE NOTES
Annie Hernandez, a 43 y.o. female presents to the ED w/ complaint of right knee pain that started around 6pm. Pt reports 9/10 pain that radiates up leg. Unable to put weight on right leg. Reports pain started out of nowhere. Took tylenol around 7pm with no relief.     Triage note:  Chief Complaint   Patient presents with    Leg Pain     Right Leg Pain and Swelling. Denies injury.      Review of patient's allergies indicates:   Allergen Reactions    Oxycodone Itching and Other (See Comments)     dizziness    Morphine Other (See Comments)     Panic attack     Past Medical History:   Diagnosis Date    Obesity     PONV (postoperative nausea and vomiting)     Uterine fibroid

## 2024-06-22 NOTE — ED PROVIDER NOTES
Encounter Date: 6/22/2024       History     Chief Complaint   Patient presents with    Leg Pain     Right Leg Pain and Swelling. Denies injury.      HPI  43-year-old female with a medical history of morbid obesity and uterine fibroids presents with complaint of right knee pain. States pain started today and has progressively worsened. Notes some swelling to the knee. No redness. Able to bend knee but painful to bear weight on it. No trauma. No fevers. No swelling to calf  States she has had knee pain in the past and was given steroids with good effect.    Review of patient's allergies indicates:   Allergen Reactions    Oxycodone Itching and Other (See Comments)     dizziness    Morphine Other (See Comments)     Panic attack     Past Medical History:   Diagnosis Date    Obesity     PONV (postoperative nausea and vomiting)     Uterine fibroid      Past Surgical History:   Procedure Laterality Date    HYSTEROSCOPIC RESECTION OF MYOMA N/A 9/4/2018    Procedure: MYOMECTOMY, HYSTEROSCOPIC;  Surgeon: Tiffani Minaya MD;  Location: [facility] OR;  Service: OB/GYN;  Laterality: N/A;    HYSTEROSCOPY      Hysteroscopic myomectomy    MOUTH SURGERY      ROBOT-ASSISTED LAPAROSCOPIC UTERINE MYOMECTOMY N/A 9/4/2018    Procedure: ROBOTIC MYOMECTOMY, UTERUS;  Surgeon: Tiffani Minaya MD;  Location: [facility] OR;  Service: OB/GYN;  Laterality: N/A;     Family History   Problem Relation Name Age of Onset    Heart disease Father      Early death Father      Breast cancer Maternal Aunt      Colon cancer Neg Hx      Ovarian cancer Neg Hx       Social History     Tobacco Use    Smoking status: Never    Smokeless tobacco: Never   Substance Use Topics    Alcohol use: Yes     Comment: rare    Drug use: No       Physical Exam     Initial Vitals [06/22/24 0008]   BP Pulse Resp Temp SpO2   133/77 100 18 98 °F (36.7 °C) 98 %      MAP       --         Physical Exam    Nursing note and vitals reviewed.  Constitutional: She appears well-developed  and well-nourished. She is not diaphoretic.   HENT:   Head: Normocephalic and atraumatic.   Neck: Neck supple.   Cardiovascular:  Normal rate and intact distal pulses.           Pulmonary/Chest: No respiratory distress.   Musculoskeletal:      Cervical back: Neck supple.      Comments: Minimalrt knee joint effusion, mild warmth, no erythema, FROM active motion, no LE swelling     Neurological: She is alert and oriented to person, place, and time. She has normal strength. GCS score is 15. GCS eye subscore is 4. GCS verbal subscore is 5. GCS motor subscore is 6.   Skin: Skin is warm and dry.         ED Course   Procedures  Labs Reviewed   ISTAT PROCEDURE          Imaging Results              X-Ray Knee 1 or 2 View Right (Final result)  Result time 06/22/24 02:45:34   Procedure changed from X-Ray Knee 3 View Right     Final result by Zack Mercado MD (06/22/24 02:45:34)                   Impression:      Tricompartmental degenerative changes and possible joint effusion.  No acute displaced fracture.  Consider outpatient knee MRI follow-up if there is concern for ligamentous or meniscal injury.      Electronically signed by: Zack Mercado MD  Date:    06/22/2024  Time:    02:45               Narrative:    EXAMINATION:  XR KNEE 1 OR 2 VIEW RIGHT    CLINICAL HISTORY:  pain;  Pain in unspecified knee    TECHNIQUE:  AP and lateral views of the right knee were performed.    COMPARISON:  04/19/2015.    FINDINGS:  Exam quality limited by suboptimal positioning and cross-table lateral technique.  No acute displaced fracture.  No dislocation.  Mild degenerative changes with cartilage space loss and marginal osteophytes most notable in the medial compartment.  Probable joint effusion and prepatellar soft tissue edema.  Persistent small calcification adjacent to the medial aspect of the proximal tibia.  No unexpected radiopaque foreign body.                                    X-Rays:   Independently Interpreted Readings:    Other Readings:  XR rt knee- no acute fracture    Medications   HYDROcodone-acetaminophen 5-325 mg per tablet 1 tablet (1 tablet Oral Given 6/22/24 0209)   predniSONE tablet 40 mg (40 mg Oral Given 6/22/24 0322)   ketorolac injection 9.999 mg (9.999 mg Intramuscular Given 6/22/24 0322)     Medical Decision Making  Pt with right knee pain- no trauma, doubt fracture or ligamentous injury but XR ordered to rule out fracture. Likely arthritis, have considered but do not suspect septic arthritis. Analgesia and reassess    XR with degenerative changes. No significant relief with norco. Normal renal function given toradol and prednisone. Discharge home with naproxen and ortho referral. Routine return precautions    Amount and/or Complexity of Data Reviewed  Radiology: ordered and independent interpretation performed. Decision-making details documented in ED Course.    Risk  Prescription drug management.                                Clinical Impression:  Final diagnoses:  [M25.569] Knee pain  [M25.561] Right knee pain, unspecified chronicity (Primary)          ED Disposition Condition    Discharge Stable          ED Prescriptions       Medication Sig Dispense Start Date End Date Auth. Provider    naproxen (NAPROSYN) 500 MG tablet Take 1 tablet (500 mg total) by mouth 2 (two) times daily with meals. for 10 days 20 tablet 6/22/2024 7/2/2024 Aby Jimenez MD    predniSONE (DELTASONE) 20 MG tablet Take 2 tablets (40 mg total) by mouth once daily. for 4 days 8 tablet 6/22/2024 6/26/2024 Aby Jimenez MD          Follow-up Information       Follow up With Specialties Details Why Contact Info Additional Information    Russell Sandhu MD Internal Medicine Schedule an appointment as soon as possible for a visit in 1 week  [address] [facility] [address]  [facility] [address]  [phone]       [facility] [place] - [facility]oped[facility] Schedule an appointment as soon as possible for a visit   [address], [facility]  Floor  [facility] [address] [phone] [facility] - Main Building, 5th Floor Please park in [facility] and take [address] elevator             Aby Jimenez MD  06/22/24 5225

## 2024-06-27 ENCOUNTER — OFFICE VISIT (OUTPATIENT)
Dept: ORTHOPEDICS | Facility: CLINIC | Age: 44
End: 2024-06-27
Payer: COMMERCIAL

## 2024-06-27 ENCOUNTER — HOSPITAL ENCOUNTER (OUTPATIENT)
Dept: RADIOLOGY | Facility: HOSPITAL | Age: 44
Discharge: HOME | End: 2024-06-27
Payer: COMMERCIAL

## 2024-06-27 VITALS — WEIGHT: 293 LBS | HEIGHT: 72 IN | BODY MASS INDEX: 39.68 KG/M2

## 2024-06-27 DIAGNOSIS — M25.561 CHRONIC PAIN OF RIGHT KNEE: Primary | ICD-10-CM

## 2024-06-27 DIAGNOSIS — M25.561 CHRONIC PAIN OF RIGHT KNEE: ICD-10-CM

## 2024-06-27 DIAGNOSIS — M17.11 PRIMARY OSTEOARTHRITIS OF RIGHT KNEE: Primary | ICD-10-CM

## 2024-06-27 DIAGNOSIS — G89.29 CHRONIC PAIN OF RIGHT KNEE: Primary | ICD-10-CM

## 2024-06-27 DIAGNOSIS — G89.29 CHRONIC PAIN OF RIGHT KNEE: ICD-10-CM

## 2024-06-27 PROCEDURE — 73564 X-RAY EXAM KNEE 4 OR MORE: CPT | Mod: TC,RT

## 2024-06-27 PROCEDURE — 73564 X-RAY EXAM KNEE 4 OR MORE: CPT | Mod: 26,RT,, | Performed by: RADIOLOGY

## 2024-06-27 PROCEDURE — 73562 X-RAY EXAM OF KNEE 3: CPT | Mod: 26,59,LT, | Performed by: RADIOLOGY

## 2024-06-27 PROCEDURE — 99999 PR PBB SHADOW E&M-EST. PATIENT-LVL III: CPT | Mod: PBBFAC,,,

## 2024-06-27 RX ORDER — TRIAMCINOLONE ACETONIDE 40 MG/ML
40 INJECTION, SUSPENSION INTRA-ARTICULAR; INTRAMUSCULAR ONCE
Status: COMPLETED | OUTPATIENT
Start: 2024-06-27 | End: 2024-06-27

## 2024-06-27 RX ADMIN — TRIAMCINOLONE ACETONIDE 40 MG: 40 INJECTION, SUSPENSION INTRA-ARTICULAR; INTRAMUSCULAR at 02:06

## 2024-06-27 NOTE — PROGRESS NOTES
SUBJECTIVE:     Chief Complaint & History of Present Illness:  Annie Hernandez is a 43 y.o. female who is seen here today with a complaint of right knee pain.  She was seen in the ED on 06/22/2024 for the right knee pain, in which she was treated with Toradol, prednisone, and naproxen for pain.    The pain has been present for a few months but has recently worsened over the past week. The patient describes the pain as a moderate dull throb located in the diffuse knee. The pain is worse with any weight bearing and knee flexion  and improved by nothing. The patient notes effusion and stiffness but no associated injury, knee giving out, knee locking, crepitus sensation.  She denies any prior treatment of the right knee pain.      Past Medical History:   Diagnosis Date    Obesity     PONV (postoperative nausea and vomiting)     Uterine fibroid        Past Surgical History:   Procedure Laterality Date    HYSTEROSCOPIC RESECTION OF MYOMA N/A 9/4/2018    Procedure: MYOMECTOMY, HYSTEROSCOPIC;  Surgeon: Tiffani Minaya MD;  Location: [facility];  Service: OB/GYN;  Laterality: N/A;    HYSTEROSCOPY      Hysteroscopic myomectomy    MOUTH SURGERY      ROBOT-ASSISTED LAPAROSCOPIC UTERINE MYOMECTOMY N/A 9/4/2018    Procedure: ROBOTIC MYOMECTOMY, UTERUS;  Surgeon: Tiffani Minaya MD;  Location: [facility];  Service: OB/GYN;  Laterality: N/A;       Family History   Problem Relation Name Age of Onset    Heart disease Father      Early death Father      Breast cancer Maternal Aunt      Colon cancer Neg Hx      Ovarian cancer Neg Hx         Review of patient's allergies indicates:   Allergen Reactions    Oxycodone Itching and Other (See Comments)     dizziness    Morphine Other (See Comments)     Panic attack           Current Outpatient Medications:     naproxen (NAPROSYN) 500 MG tablet, Take 1 tablet (500 mg total) by mouth 2 (two) times daily with meals. for 10 days, Disp: 20 tablet, Rfl: 0      Review of  Systems:  ROS:  The updated medical history is in the chart and has been reviewed. A review of systems is updated and there is no reported vision changes, ear/nose/mouth/throat complaints, chest pain, shortness of breath, abdominal pain, urological complaints, fevers or chills, psychiatric complaints. Musculoskeletal and neurologcial symptoms are as documented. All other systems are negative.      OBJECTIVE:     PHYSICAL EXAM:  There were no vitals taken for this visit.  General: Pleasant, cooperative, NAD.  HEENT: NCAT, sclera nonicteric.  Lungs: Respirations are equal and unlabored.   Abdomen: Soft and non-tender.  CV: 2+ bilateral upper and lower extremity pulses.  Neuro: Sensation intact to light touch.  Skin: Intact throughout LE with no rashes, erythema, or lesions.  Extremities: No LE edema, NVI lower extremities. antalgic gait.    right Knee Exam:  Knee Range of Motion:  0-110   Effusion:  Mild  Condition of skin: intact  Location of tenderness: Medial joint line   Strength: 5/5 quadriceps strength, 5/5 gastroc-soleus strength, 5/5 hamstring strength, and 5/5 tibialis anterior strength  Stability: stable to testing  Knee Alignment: normal  Adrian: negative    right Hip Exam:  TTP: None  90 degrees flexion  10 degrees extension   10 degrees internal rotation  30 degrees external rotation  30 degrees abduction  20 degrees adduction   0 flexion contracture   negative FIORELLA   negative [facility]  [facility]    RADIOGRAPHS:  X-rays of the right knee taken today personally reviewed. Imaging reveals moderate medial tibiofemoral joint space narrowing with osteophytes present.      ASSESSMENT:       ICD-10-CM ICD-9-CM   1. Primary osteoarthritis of right knee  M17.11 715.16       PLAN:     We discussed with the patient at length all the different treatment options available including anti-inflammatories, acetaminophen, rest, ice, knee strengthening exercise, occasional cortisone injections for temporary  relief, Viscosupplimentation injections, arthroscopic debridement, osteotomy, and finally knee arthroplasty.     Patient elected to receive right knee intra-articular CSI for immediate relief.    Knee Injection Procedure Note  Diagnosis: right knee degenerative arthritis  Indications: right knee pain  Procedure Details: Verbal consent was obtained for the procedure. The injection site was identified and the skin was prepared with alcohol. The right knee was injected from an anterolateral approach with 1 ml of Kenalog and 4 ml Lidocaine under sterile technique using a 22 gauge needle. The needle was removed and the area cleansed and dressed.  Complications:  Patient tolerated the procedure well.    she was advised to rest the knee today, using ice and elevation as needed for comfort and swelling.Immediate relief of the knee pain may be short lived and secondary to the lidocaine. she may have an increase in discomfort tonight followed by steady improvement over the next several days. It may take 1-2 weeks following the injection to get the full benefit of the medication.    Follow up in clinic as needed.

## (undated) DEVICE — SUT MCRYL PLUS 4-0 PS2 27IN

## (undated) DEVICE — UNDERGLOVE BIOGEL PI SZ 6.5 LF

## (undated) DEVICE — SUT 0 V-LOC GR GS-21 TAPER

## (undated) DEVICE — SEE MEDLINE ITEM 156923

## (undated) DEVICE — TIP RUMI GREEN DISPOSABLE6.7MM

## (undated) DEVICE — KIT POWDER ABSORBABLE GELATIN

## (undated) DEVICE — APPLICATOR ENDOSCOPIC

## (undated) DEVICE — SET TRI-LUMEN FILTERED TUBE

## (undated) DEVICE — POSITIONER HEAD ADULT

## (undated) DEVICE — JELLY KY LUBRICATING 5G PACKET

## (undated) DEVICE — KIT PROCEDURE STER INLET CLOSU

## (undated) DEVICE — SYR 30CC LUER LOCK

## (undated) DEVICE — DEVICE TRUCLEAR ULTRA MINI

## (undated) DEVICE — ELECTRODE REM PLYHSV RETURN 9

## (undated) DEVICE — KIT WING PAD POSITIONING

## (undated) DEVICE — SET DECANTER MEDICHOICE

## (undated) DEVICE — NDL INSUF ULTRA VERESS 120MM

## (undated) DEVICE — SUT V-LOC ABSRB WOUND CLSR

## (undated) DEVICE — SOL NS 1000CC

## (undated) DEVICE — SOL STRL WATER INJ 1000ML BG

## (undated) DEVICE — NDL SPINAL SPINOCAN 22GX3.5

## (undated) DEVICE — SOL ELECTROLUBE ANTI-STIC

## (undated) DEVICE — DEVICE ANC SW STAT FOLEY 6-24

## (undated) DEVICE — SOL CLEARIFY VISUALIZATION LAP

## (undated) DEVICE — GLOVE BIOGEL SKINSENSE PI 6.0

## (undated) DEVICE — INSERT CUSHIONPRONE VIEW LARGE

## (undated) DEVICE — COVER TIP CURVED SCISSORS XI

## (undated) DEVICE — CLOSURE SKIN STERI STRIP 1/8X3

## (undated) DEVICE — PORT AIRSEAL 12/120MM LPI